# Patient Record
Sex: FEMALE | Race: WHITE | Employment: FULL TIME | URBAN - METROPOLITAN AREA
[De-identification: names, ages, dates, MRNs, and addresses within clinical notes are randomized per-mention and may not be internally consistent; named-entity substitution may affect disease eponyms.]

---

## 2017-01-12 ENCOUNTER — OFFICE VISIT (OUTPATIENT)
Dept: FAMILY MEDICINE CLINIC | Age: 57
End: 2017-01-12

## 2017-01-12 ENCOUNTER — TELEPHONE (OUTPATIENT)
Dept: FAMILY MEDICINE CLINIC | Age: 57
End: 2017-01-12

## 2017-01-12 VITALS
RESPIRATION RATE: 16 BRPM | HEART RATE: 90 BPM | BODY MASS INDEX: 25.61 KG/M2 | DIASTOLIC BLOOD PRESSURE: 74 MMHG | TEMPERATURE: 97.2 F | OXYGEN SATURATION: 97 % | WEIGHT: 150 LBS | HEIGHT: 64 IN | SYSTOLIC BLOOD PRESSURE: 128 MMHG

## 2017-01-12 DIAGNOSIS — E78.00 ELEVATED CHOLESTEROL: ICD-10-CM

## 2017-01-12 DIAGNOSIS — I10 ESSENTIAL HYPERTENSION: ICD-10-CM

## 2017-01-12 DIAGNOSIS — M47.812 DJD (DEGENERATIVE JOINT DISEASE), CERVICAL: ICD-10-CM

## 2017-01-12 DIAGNOSIS — J06.9 VIRAL URI WITH COUGH: Primary | ICD-10-CM

## 2017-01-12 DIAGNOSIS — N95.1 MENOPAUSAL SYNDROME (HOT FLASHES): ICD-10-CM

## 2017-01-12 RX ORDER — AZITHROMYCIN 250 MG/1
TABLET, FILM COATED ORAL
Qty: 6 TAB | Refills: 0 | Status: SHIPPED | OUTPATIENT
Start: 2017-01-12 | End: 2017-05-10 | Stop reason: ALTCHOICE

## 2017-01-12 RX ORDER — PROMETHAZINE HYDROCHLORIDE AND CODEINE PHOSPHATE 6.25; 1 MG/5ML; MG/5ML
5-10 SOLUTION ORAL
Qty: 240 ML | Refills: 0 | Status: SHIPPED | OUTPATIENT
Start: 2017-01-12 | End: 2017-05-10 | Stop reason: ALTCHOICE

## 2017-01-12 NOTE — MR AVS SNAPSHOT
Visit Information Date & Time Provider Department Dept. Phone Encounter #  
 1/12/2017 11:45 AM Loni Sanford MD [address] [place] [address] Road [phone] [id number] Your Appointments 9/29/2017  9:00 AM  
COMPLETE PHYSICAL with Loni Sanford MD  
[facility]) Appt Note: complete physical  
 [address] [id number]  
[phone]  
  
   
 [address] [address] Upcoming Health Maintenance Date Due Pneumococcal 19-64 Highest Risk (1 of 3 - PCV13) 7/19/1978 DTaP/Tdap/Td series (1 - Tdap) 7/19/1980 BREAST CANCER SCRN MAMMOGRAM 5/19/2016 PAP AKA CERVICAL CYTOLOGY 6/17/2018 COLONOSCOPY 8/31/2025 Allergies as of 1/12/2017  Review Complete On: 1/12/2017 By: Dianne Allen LPN Severity Noted Reaction Type Reactions Aspirin  03/29/2010    Nausea and Vomiting Current Immunizations  Never Reviewed No immunizations on file. Not reviewed this visit Vitals BP Pulse Temp Resp Height(growth percentile) Weight(growth percentile) 128/74 (BP 1 Location: Right arm, BP Patient Position: Sitting) 90 97.2 °F (36.2 °C) (Oral) 16 5' 4\" (1.626 m) 150 lb (68 kg) LMP SpO2 BMI OB Status Smoking Status 11/15/2010 97% 25.75 kg/m2 Postmenopausal Never Smoker Vitals History BMI and BSA Data Body Mass Index Body Surface Area 25.75 kg/m 2 1.75 m 2 Preferred Pharmacy Pharmacy Name Phone [facility] # [address], [address] [phone] Your Updated Medication List  
  
   
This list is accurate as of: 1/12/17  1:03 PM.  Always use your most recent med list.  
  
  
  
  
 azithromycin 250 mg tablet Commonly known as:  Idelia Fines Take 2 today and then 1 a day for the next 4 days  
  
 estradiol-norethindrone 1-0.5 mg per tablet Commonly known as:  [place] Girma Take 1 tablet by mouth daily. lisinopril 20 mg tablet Commonly known as:  Marilynne Shows Take 1 Tab by mouth daily. promethazine-codeine 6.25-10 mg/5 mL syrup Commonly known as:  PHENERGAN with CODEINE Take 5-10 mL by mouth four (4) times daily as needed for Cough. Max Daily Amount: 40 mL. Prescriptions Printed Refills  
 promethazine-codeine (PHENERGAN WITH CODEINE) 6.25-10 mg/5 mL syrup 0 Sig: Take 5-10 mL by mouth four (4) times daily as needed for Cough. Max Daily Amount: 40 mL. Class: Print Route: Oral  
  
Prescriptions Sent to Pharmacy Refills  
 azithromycin (ZITHROMAX) 250 mg tablet 0 Sig: Take 2 today and then 1 a day for the next 4 days Class: Normal  
 [facility] # [address] Ph #: [phone] Patient Instructions Allergies: Care Instructions Your Care Instructions Allergies occur when your body's defense system (immune system) overreacts to certain substances. The immune system treats a harmless substance as if it were a harmful germ or virus. Many things can cause this overreaction, including pollens, medicine, food, dust, animal dander, and mold. Allergies can be mild or severe. Mild allergies can be managed with home treatment. But medicine may be needed to prevent problems. Managing your allergies is an important part of staying healthy. Your doctor may suggest that you have allergy testing to help find out what is causing your allergies. When you know what things trigger your symptoms, you can avoid them. This can prevent allergy symptoms and other health problems. For severe allergies that cause reactions that affect your whole body (anaphylactic reactions), your doctor may prescribe a shot of epinephrine to carry with you in case you have a severe reaction. Learn how to give yourself the shot and keep it with you at all times. Make sure it is not . Follow-up care is a key part of your treatment and safety. Be sure to make and go to all appointments, and call your doctor if you are having problems. It's also a good idea to know your test results and keep a list of the medicines you take. How can you care for yourself at home? · If you have been told by your doctor that dust or dust mites are causing your allergy, decrease the dust around your bed: 
[facility] sheets, pillowcases, and other bedding in hot water every week. ¨ Use dust-proof covers for pillows, duvets, and mattresses. Avoid plastic covers because they tear easily and do not \"breathe. \" Wash as instructed on the label. ¨ Do not use any blankets and pillows that you do not need. ¨ Use blankets that you can wash in your washing machine. ¨ Consider removing drapes and carpets, which attract and hold dust, from your bedroom. · If you are allergic to house dust and mites, do not use home humidifiers. Your doctor can suggest ways you can control dust and mites. · Look for signs of cockroaches. Cockroaches cause allergic reactions. Use cockroach baits to get rid of them. Then, clean your home well. Cockroaches like areas where grocery bags, newspapers, empty bottles, or cardboard boxes are stored. Do not keep these inside your home, and keep trash and food containers sealed. Seal off any spots where cockroaches might enter your home. · If you are allergic to mold, get rid of furniture, rugs, and drapes that smell musty. Check for mold in the bathroom. · If you are allergic to outdoor pollen or mold spores, use air-conditioning. Change or clean all filters every month. Keep windows closed. · If you are allergic to pollen, stay inside when pollen counts are high. Use a vacuum  with a HEPA filter or a double-thickness filter at least two times each week. · Stay inside when air pollution is bad. Avoid paint fumes, perfumes, and other strong odors. · Avoid conditions that make your allergies worse. Stay away from smoke. Do not smoke or let anyone else smoke in your house. Do not use fireplaces or wood-burning stoves. · If you are allergic to your pets, change the air filter in your furnace every month. Use high-efficiency filters. · If you are allergic to pet dander, keep pets outside or out of your bedroom. Old carpet and cloth furniture can hold a lot of animal dander. You may need to replace them. When should you call for help? Give an epinephrine shot if: 
· You think you are having a severe allergic reaction. · You have symptoms in more than one body area, such as mild nausea and an itchy mouth. After giving an epinephrine shot call 911, even if you feel better. Call 911 if: 
· You have symptoms of a severe allergic reaction. These may include: 
¨ Sudden raised, red areas (hives) all over your body. ¨ Swelling of the throat, mouth, lips, or tongue. ¨ Trouble breathing. ¨ Passing out (losing consciousness). Or you may feel very lightheaded or suddenly feel weak, confused, or restless. · You have been given an epinephrine shot, even if you feel better. Call your doctor now or seek immediate medical care if: 
· You have symptoms of an allergic reaction, such as: ¨ A rash or hives (raised, red areas on the skin). ¨ Itching. ¨ Swelling. ¨ Belly pain, nausea, or vomiting. Watch closely for changes in your health, and be sure to contact your doctor if: 
· You do not get better as expected. Where can you learn more? Go to http://sherman-laya.info/. Enter Z605 in the search box to learn more about \"Allergies: Care Instructions. \" Current as of: February 12, 2016 Content Version: 11.1 © 3200-2816 [employer]. Care instructions adapted under license by [employer] (which disclaims liability or warranty for this information).  If you have questions about a medical condition or this instruction, always ask your healthcare professional. [place] [address] any warranty or liability for your use of this information. Please provide this summary of care documentation to your next provider. Your primary care clinician is listed as [place] [address], [facility]/s . If you have any questions after today's visit, please call [phone].

## 2017-01-12 NOTE — PATIENT INSTRUCTIONS

## 2017-01-12 NOTE — TELEPHONE ENCOUNTER
Javad Maya      -     [phone]    -  Patient demanding to see Maya Jackson due to a cold she has-  Advised nothing avail-  Can we fit her in  some where-  Please advise

## 2017-01-12 NOTE — PROGRESS NOTES
HISTORY OF PRESENT ILLNESS  BERENICE Ramirez is a 61 y.o. Female with a history of HTN and elevated cholesterol who presents to the office today for cold symptoms. She notes she has had a cough, runny nose and sneezing for the past 4 days. She denies a history of pneumonia. She notes she was recently exposed to a child with similar symptoms. Past Medical History   Diagnosis Date    DJD (degenerative joint disease), cervical 9/6/2010    Elevated cholesterol 8/09    HTN (hypertension) 6/09     Past Surgical History   Procedure Laterality Date    Hx appendectomy  1985    Hx refractive surgery  2008     Current Outpatient Prescriptions on File Prior to Visit   Medication Sig Dispense Refill    lisinopril (PRINIVIL, ZESTRIL) 20 mg tablet Take 1 Tab by mouth daily. 90 Tab 1    estradiol-norethindrone (ACTIVELLA) 1-0.5 mg per tablet Take 1 tablet by mouth daily. No current facility-administered medications on file prior to visit. Allergies   Allergen Reactions    Aspirin Nausea and Vomiting     Family History   Problem Relation Age of Onset    Hypertension Mother     Stroke Mother      age 40    Hypertension Maternal Grandmother     Stroke Maternal Grandmother      Social History     Social History    Marital status:      Spouse name: N/A    Number of children: N/A    Years of education: N/A     Occupational History          Social History Main Topics    Smoking status: Never Smoker    Smokeless tobacco: Never Used    Alcohol use Yes    Drug use: None    Sexual activity: Yes     Partners: Male     Other Topics Concern    Special Diet Yes     healthy    Exercise Yes     aerobic     Social History Narrative           Review of Systems   Constitutional: Negative for chills, diaphoresis, fever, malaise/fatigue and weight loss. HENT:        Runny nose and sneezing   Eyes: Negative for blurred vision, double vision, pain and redness. Respiratory: Positive for cough. Negative for shortness of breath and wheezing. Cardiovascular: Negative for chest pain, palpitations, orthopnea, claudication, leg swelling and PND. Skin: Negative for itching and rash. Neurological: Negative for dizziness, tingling, tremors, sensory change, speech change, focal weakness, seizures, loss of consciousness, weakness and headaches. Results for orders placed or performed in visit on 44/86/23   METABOLIC PANEL, COMPREHENSIVE   Result Value Ref Range    Glucose 87 65 - 99 mg/dL    BUN 14 6 - 24 mg/dL    Creatinine 0.77 0.57 - 1.00 mg/dL    GFR est non-AA 86 >59 mL/min/1.73    GFR est AA 99 >59 mL/min/1.73    BUN/Creatinine ratio 18 9 - 23    Sodium 140 136 - 144 mmol/L    Potassium 4.8 3.5 - 5.2 mmol/L    Chloride 102 97 - 106 mmol/L    CO2 22 18 - 29 mmol/L    Calcium 9.3 8.7 - 10.2 mg/dL    Protein, total 6.7 6.0 - 8.5 g/dL    Albumin 4.3 3.5 - 5.5 g/dL    GLOBULIN, TOTAL 2.4 1.5 - 4.5 g/dL    A-G Ratio 1.8 1.1 - 2.5    Bilirubin, total 0.3 0.0 - 1.2 mg/dL    Alk.  phosphatase 57 39 - 117 IU/L    AST 14 0 - 40 IU/L    ALT 11 0 - 32 IU/L   LIPID PANEL   Result Value Ref Range    Cholesterol, total 247 (H) 100 - 199 mg/dL    Triglyceride 200 (H) 0 - 149 mg/dL    HDL Cholesterol 66 >39 mg/dL    VLDL, calculated 40 5 - 40 mg/dL    LDL, calculated 141 (H) 0 - 99 mg/dL   CBC W/O DIFF   Result Value Ref Range    WBC 12.1 (H) 3.4 - 10.8 x10E3/uL    RBC 4.25 3.77 - 5.28 x10E6/uL    HGB 12.8 11.1 - 15.9 g/dL    HCT 38.4 34.0 - 46.6 %    MCV 90 79 - 97 fL    MCH 30.1 26.6 - 33.0 pg    MCHC 33.3 31.5 - 35.7 g/dL    RDW 13.6 12.3 - 15.4 %    PLATELET 974 102 - 937 x10E3/uL   URINALYSIS W/MICROSCOPIC   Result Value Ref Range    Specific Gravity 1.011 1.005 - 1.030    pH (UA) 5.0 5.0 - 7.5    Color Yellow Yellow    Appearance Clear Clear    Leukocyte Esterase Trace (A) Negative    Protein Negative Negative/Trace    Glucose Negative Negative    Ketone Negative Negative    Blood Negative Negative    Bilirubin Negative Negative    Urobilinogen 0.2 0.2 - 1.0 mg/dL    Nitrites Negative Negative    Microscopic Examination See additional order    MICROSCOPIC EXAMINATION   Result Value Ref Range    WBC 0-5 0 - 5 /hpf    RBC 0-2 0 - 2 /hpf    Epithelial cells 0-10 0 - 10 /hpf    Casts None seen None seen /lpf    Mucus Present Not Estab. Bacteria Moderate (A) None seen/Few   CVD REPORT   Result Value Ref Range    INTERPRETATION Note            Physical Exam  Visit Vitals    /74 (BP 1 Location: Right arm, BP Patient Position: Sitting)    Pulse 90    Temp 97.2 °F (36.2 °C) (Oral)    Resp 16    Ht 5' 4\" (1.626 m)    Wt 150 lb (68 kg)    LMP 11/15/2010    SpO2 97%    BMI 25.75 kg/m2     Throat: Lips, mucosa, and tongue normal. Teeth and gums normal  Neck: supple, symmetrical, trachea midline, no adenopathy, thyroid: not enlarged, symmetric, no tenderness/mass/nodules, no carotid bruit and no JVD  Lungs: very wet sounding cough with rhonchi heard anteriorly on both sides, left side is clear posteriorly and the right side has rhonchi and few squeaks with normal respiration and coughing, they do improve after several coughs but do not resolve          ASSESSMENT and PLAN    ICD-10-CM ICD-9-CM    1. Viral URI with cough J06.9 465.9     B97.89     2. Essential hypertension I10 401.9    3. Elevated cholesterol E78.00 272.0    4. Menopausal syndrome (hot flashes) N95.1 627.2    5. DJD (degenerative joint disease), cervical M50.30 722.4      Mariella Erazo was seen today for cold symptoms. Diagnoses and all orders for this visit:    Viral URI with cough    Essential hypertension    Elevated cholesterol    Menopausal syndrome (hot flashes)    DJD (degenerative joint disease), cervical    Other orders  -     azithromycin (ZITHROMAX) 250 mg tablet; Take 2 today and then 1 a day for the next 4 days  -     promethazine-codeine (PHENERGAN WITH CODEINE) 6.25-10 mg/5 mL syrup;  Take 5-10 mL by mouth four (4) times daily as needed for Cough. Max Daily Amount: 40 mL. Follow-up Disposition:  Return if symptoms worsen or fail to improve. reviewed medications and side effects in detail  Please call my office if there are any questions- [phone]. Discussed expected course/resolution/complications of diagnosis in detail with patient. Medication risks/benefits/costs/interactions/alternatives discussed with patient. Pt was given an after visit summary which includes diagnoses, current medications & vitals. Pt expressed understanding with the diagnosis and plan. Total 25 minutes,60 % counseling re: Patient to call if no better in 3 -4 days and prn new problems. I recommended Mucinex DM during the day and Phenergan with Codeine at night. .Because of the deep cough and associated sore throat, I want her to start Z-[place]  The benefit/risk, onset of action, method of taking, and side effects of this medication discussed in detail with the patient. Reviewed in detail the proper technique of checking the blood pressure- check it on an average day only, not on a stressful day, sitting, no exercise for at least 1 hour and not experiencing any new pain( chronic pain is OK). Patient encouraged to check BP sitting and standing at least once a month and to report these readings to me if > 140/ 90 on average , or if the standing BP is >  15 points lower than the sitting. \Reviewed symptoms, or lack thereof, of hypertension and elevated cholesterol. Also, discussed symptoms of concern that were noted today in the note above, treatment options( including doing nothing), when to follow up before recommended time frame. Also, answered all questions. This document was written by Giovana Gandhi, as dictated by Amber Ott MD.   I have reviewed and agree with the above note and have made corrections where appropriate Walter Serrano M.D.

## 2017-01-12 NOTE — PROGRESS NOTES
Chief Complaint   Patient presents with    Cold Symptoms     cough with litle mucus -clear, runny nose, sneezing started Monday       Reviewed Record in preparation for visit and have obtained necessary documentation. Identified pt with two pt identifiers (Name @ )    Health Maintenance Due   Topic    Pneumococcal 19-64 Highest Risk (1 of 3 - PCV13)    DTaP/Tdap/Td series (1 - Tdap)    BREAST CANCER SCRN MAMMOGRAM          1. Have you been to the ER, urgent care clinic since your last visit? Hospitalized since your last visit? No    2. Have you seen or consulted any other health care providers outside of the [address] [place] [address] since your last visit? Include any pap smears or colon screening.  No

## 2017-05-10 ENCOUNTER — OFFICE VISIT (OUTPATIENT)
Dept: FAMILY MEDICINE CLINIC | Age: 57
End: 2017-05-10

## 2017-05-10 VITALS
BODY MASS INDEX: 25.54 KG/M2 | OXYGEN SATURATION: 99 % | TEMPERATURE: 98 F | DIASTOLIC BLOOD PRESSURE: 86 MMHG | HEIGHT: 64 IN | SYSTOLIC BLOOD PRESSURE: 114 MMHG | HEART RATE: 78 BPM | RESPIRATION RATE: 18 BRPM | WEIGHT: 149.6 LBS

## 2017-05-10 DIAGNOSIS — R21 RASH OF HANDS: Primary | ICD-10-CM

## 2017-05-10 DIAGNOSIS — I10 ESSENTIAL HYPERTENSION: ICD-10-CM

## 2017-05-10 DIAGNOSIS — E78.5 HYPERLIPIDEMIA LDL GOAL <130: ICD-10-CM

## 2017-05-10 RX ORDER — PREDNISONE 20 MG/1
20 TABLET ORAL 3 TIMES DAILY
Qty: 21 TAB | Refills: 0 | Status: SHIPPED | OUTPATIENT
Start: 2017-05-10 | End: 2018-01-05 | Stop reason: ALTCHOICE

## 2017-05-10 NOTE — PROGRESS NOTES
Chief Complaint   Patient presents with    Rash     Redness to palm of hands and bottom of both feet. Patient states that it is hard to walk on. Palms of hands and between fingers are itchy. Patient had difficulty swallowing Monday and Tuesday night. 1. Have you been to the ER, urgent care clinic since your last visit? Hospitalized since your last visit? No    2. Have you seen or consulted any other health care providers outside of the [facility] since your last visit? Include any pap smears or colon screening. No     Patient states areas on hands and feet happened over night.

## 2017-05-10 NOTE — PROGRESS NOTES
HISTORY OF PRESENT ILLNESS  HPI  Jose Barrera is a 61 y.o. Female with history of HTN and hyperlipidemia who presents to office today for rash. Pt complains of an itchy, red rash on both palms and the bottom of both feet since the middle of last night. She states that she visited her grandson on 5/5/17 and that he was recovering from an illness with symptoms of a similar rash, along with a fever. Pt notes some difficulty swallowing on 5/8/17. Pt denies fever. Past Medical History:   Diagnosis Date    DJD (degenerative joint disease), cervical 9/6/2010    Elevated cholesterol 8/09    HTN (hypertension) 6/09     Past Surgical History:   Procedure Laterality Date    HX APPENDECTOMY  1985    HX REFRACTIVE SURGERY  2008     Current Outpatient Prescriptions on File Prior to Visit   Medication Sig Dispense Refill    lisinopril (PRINIVIL, ZESTRIL) 20 mg tablet Take 1 Tab by mouth daily. 90 Tab 1    estradiol-norethindrone (ACTIVELLA) 1-0.5 mg per tablet Take 1 tablet by mouth daily. No current facility-administered medications on file prior to visit. Allergies   Allergen Reactions    Aspirin Nausea and Vomiting     Family History   Problem Relation Age of Onset    Hypertension Mother     Stroke Mother      age 40    Hypertension Maternal Grandmother     Stroke Maternal Grandmother      Social History     Social History    Marital status:      Spouse name: N/A    Number of children: N/A    Years of education: N/A     Occupational History          Social History Main Topics    Smoking status: Never Smoker    Smokeless tobacco: Never Used    Alcohol use Yes    Drug use: None    Sexual activity: Yes     Partners: Male     Other Topics Concern    Special Diet Yes     healthy    Exercise Yes     aerobic     Social History Narrative               Review of Systems   Constitutional: Negative for chills, diaphoresis, fever, malaise/fatigue and weight loss.    HENT: Positive for congestion. Eyes: Negative for blurred vision, double vision, pain and redness. Respiratory: Negative for cough, shortness of breath and wheezing. Cardiovascular: Negative for chest pain, palpitations, orthopnea, claudication, leg swelling and PND. Skin: Positive for itching and rash (bilateral palms & bottom of right foot). Neurological: Negative for dizziness, tingling, tremors, sensory change, speech change, focal weakness, seizures, loss of consciousness, weakness and headaches. Results for orders placed or performed in visit on 22/24/78   METABOLIC PANEL, COMPREHENSIVE   Result Value Ref Range    Glucose 87 65 - 99 mg/dL    BUN 14 6 - 24 mg/dL    Creatinine 0.77 0.57 - 1.00 mg/dL    GFR est non-AA 86 >59 mL/min/1.73    GFR est AA 99 >59 mL/min/1.73    BUN/Creatinine ratio 18 9 - 23    Sodium 140 136 - 144 mmol/L    Potassium 4.8 3.5 - 5.2 mmol/L    Chloride 102 97 - 106 mmol/L    CO2 22 18 - 29 mmol/L    Calcium 9.3 8.7 - 10.2 mg/dL    Protein, total 6.7 6.0 - 8.5 g/dL    Albumin 4.3 3.5 - 5.5 g/dL    GLOBULIN, TOTAL 2.4 1.5 - 4.5 g/dL    A-G Ratio 1.8 1.1 - 2.5    Bilirubin, total 0.3 0.0 - 1.2 mg/dL    Alk.  phosphatase 57 39 - 117 IU/L    AST (SGOT) 14 0 - 40 IU/L    ALT (SGPT) 11 0 - 32 IU/L   LIPID PANEL   Result Value Ref Range    Cholesterol, total 247 (H) 100 - 199 mg/dL    Triglyceride 200 (H) 0 - 149 mg/dL    HDL Cholesterol 66 >39 mg/dL    VLDL, calculated 40 5 - 40 mg/dL    LDL, calculated 141 (H) 0 - 99 mg/dL   CBC W/O DIFF   Result Value Ref Range    WBC 12.1 (H) 3.4 - 10.8 x10E3/uL    RBC 4.25 3.77 - 5.28 x10E6/uL    HGB 12.8 11.1 - 15.9 g/dL    HCT 38.4 34.0 - 46.6 %    MCV 90 79 - 97 fL    MCH 30.1 26.6 - 33.0 pg    MCHC 33.3 31.5 - 35.7 g/dL    RDW 13.6 12.3 - 15.4 %    PLATELET 045 288 - 290 x10E3/uL   URINALYSIS W/MICROSCOPIC   Result Value Ref Range    Specific Gravity 1.011 1.005 - 1.030    pH (UA) 5.0 5.0 - 7.5    Color Yellow Yellow    Appearance Clear Clear Leukocyte Esterase Trace (A) Negative    Protein Negative Negative/Trace    Glucose Negative Negative    Ketone Negative Negative    Blood Negative Negative    Bilirubin Negative Negative    Urobilinogen 0.2 0.2 - 1.0 mg/dL    Nitrites Negative Negative    Microscopic Examination See additional order    MICROSCOPIC EXAMINATION   Result Value Ref Range    WBC 0-5 0 - 5 /hpf    RBC 0-2 0 - 2 /hpf    Epithelial cells 0-10 0 - 10 /hpf    Casts None seen None seen /lpf    Mucus Present Not Estab. Bacteria Moderate (A) None seen/Few   CVD REPORT   Result Value Ref Range    INTERPRETATION Note                Physical Exam  Visit Vitals    /86 (BP 1 Location: Right arm, BP Patient Position: Sitting)    Pulse 78    Temp 98 °F (36.7 °C) (Oral)    Resp 18    Ht 5' 4\" (1.626 m)    Wt 149 lb 9.6 oz (67.9 kg)    LMP 11/15/2010    SpO2 99%    BMI 25.68 kg/m2     Eyes: conjunctivae/corneas clear. PERRL, EOM's intact. Fundi benign  Nose: Nares normal. Septum midline. mild congestion with little bit of watery drainage, tender erythematous area on her right lateral nose area, no pustule  Throat: Mild diffuse erythema, there appears to be a superficial ulcer on the right side of her soft palate approximately 3-4 mm in diameter  Neck: supple, symmetrical, trachea midline, no adenopathy, thyroid: not enlarged, symmetric, no tenderness/mass/nodules, no carotid bruit and no JVD  Lungs: clear to auscultation bilaterally  Heart: regular rate and rhythm, S1, S2 normal, no murmur, click, rub or gallop  Hands: multiple 3 mm erythematous areas, some of them have central clearing and they seem to annie partially, no signs of any ulcerations or pustules; she has a similar but less prominent area on the bottom of her right foot. all these areas itch and are a little bit tender to palpation                    ASSESSMENT and PLAN    ICD-10-CM ICD-9-CM    1. Rash of hands- viral R21 782. 1 predniSONE (DELTASONE) 20 mg tablet   2. Essential hypertension I10 401.9    3. Hyperlipidemia LDL goal <130 E78.5 272.4      Melissa was seen today for rash. Diagnoses and all orders for this visit:    Rash of hands- viral  -     predniSONE (DELTASONE) 20 mg tablet; Take 1 Tab by mouth three (3) times daily. Essential hypertension    Hyperlipidemia LDL goal <130      Follow-up Disposition:  Return if symptoms worsen or fail to improve. reviewed medications and side effects in detail  Please call my office if there are any questions- [phone]. Discussed expected course/resolution/complications of diagnosis in detail with patient. Medication risks/benefits/costs/interactions/alternatives discussed with patient. Pt was given an after visit summary which includes diagnoses, current medications & vitals. Pt expressed understanding with the diagnosis and plan. Patient to call if no better in 3 -4 days and prn new problems. Most likely this is the hand foot and mouth disease that her grandson had. It usually goes away in a few days. She should do symptomatic treatment with Benadryl up to 50mg every 6 hours, and if that makes her drowsy she can use that at night and use Allegra during the day. If she's no better overnight with the Benadryl and Allegra, she can give a trial of prednisone as well. If she's doing well with the Benadryl, I would just stick with that. This document was written by Michell Hartman, as dictated by Loc Mijares MD.  I have reviewed and agree with the above note and have made corrections where appropriate Walter Owens M.D.

## 2017-05-10 NOTE — MR AVS SNAPSHOT
Visit Information Date & Time Provider Department Dept. Phone Encounter #  
 5/10/2017  4:15 PM Evan Jeff MD [facility] [phone] [id number] Your Appointments 9/29/2017  9:00 AM  
COMPLETE PHYSICAL with Evan Jeff MD  
[facility]) Appt Note: complete physical  
 [address] [phone]  
[phone]  
  
   
 [address] [phone] Upcoming Health Maintenance Date Due DTaP/Tdap/Td series (1 - Tdap) 7/19/1980 BREAST CANCER SCRN MAMMOGRAM 5/19/2016 INFLUENZA AGE 9 TO ADULT 8/1/2017 Pneumococcal 19-64 Highest Risk (2 of 3 - PCV13) 1/15/2018 PAP AKA CERVICAL CYTOLOGY 6/17/2018 COLONOSCOPY 8/31/2025 Allergies as of 5/10/2017  Review Complete On: 5/10/2017 By: Paris Winkler LPN Severity Noted Reaction Type Reactions Aspirin  03/29/2010    Nausea and Vomiting Current Immunizations  Never Reviewed No immunizations on file. Not reviewed this visit Vitals BP Pulse Temp Resp Height(growth percentile) Weight(growth percentile) 114/86 (BP 1 Location: Right arm, BP Patient Position: Sitting) 78 98 °F (36.7 °C) (Oral) 18 5' 4\" (1.626 m) 149 lb 9.6 oz (67.9 kg) LMP SpO2 BMI OB Status Smoking Status 11/15/2010 99% 25.68 kg/m2 Postmenopausal Never Smoker BMI and BSA Data Body Mass Index Body Surface Area  
 25.68 kg/m 2 1.75 m 2 Preferred Pharmacy Pharmacy Name Phone [facility] [phone] [facility] [phone] Your Updated Medication List  
  
   
This list is accurate as of: 5/10/17  4:50 PM.  Always use your most recent med list.  
  
  
  
  
 estradiol-norethindrone 1-0.5 mg per tablet Commonly known as:  Ludlow Members Take 1 tablet by mouth daily. lisinopril 20 mg tablet Commonly known as:  Adina Moulder Take 1 Tab by mouth daily. predniSONE 20 mg tablet Commonly known as:  Edelmira Guadalupe Take 1 Tab by mouth three (3) times daily. Prescriptions Printed Refills  
 predniSONE (DELTASONE) 20 mg tablet 0 Sig: Take 1 Tab by mouth three (3) times daily. Class: Print Route: Oral  
  
Patient Instructions Rash: Care Instructions Your Care Instructions A rash is any irritation or inflammation of the skin. Rashes have many possible causes, including allergy, infection, illness, heat, and emotional stress. Follow-up care is a key part of your treatment and safety. Be sure to make and go to all appointments, and call your doctor if you are having problems. Its also a good idea to know your test results and keep a list of the medicines you take. How can you care for yourself at home? · Wash the area with water only. Soap can make dryness and itching worse. Pat dry. · Put cold, wet cloths on the rash to reduce itching. · Keep cool, and stay out of the sun. · Leave the rash open to the air as much of the time as possible. · Sometimes petroleum jelly (Vaseline) can help relieve the discomfort caused by a rash. A moisturizing lotion, such as Cetaphil, also may help. Calamine lotion may help for rashes caused by contact with something (such as a plant or soap) that irritated the skin. Use it 3 or 4 times a day. · If your doctor prescribed a cream, use it as directed. If your doctor prescribed medicine, take it exactly as directed. · If your rash itches so badly that it interferes with your normal activities, take an over-the-counter antihistamine, such as diphenhydramine (Benadryl) or loratadine (Claritin). Read and follow all instructions on the label. When should you call for help? Call your doctor now or seek immediate medical care if: 
· You have signs of infection, such as: 
¨ Increased pain, swelling, warmth, or redness. ¨ Red streaks leading from the area. ¨ Pus draining from the area. ¨ A fever. · You have joint pain along with the rash. Watch closely for changes in your health, and be sure to contact your doctor if: 
· Your rash is changing or getting worse. For example, call if you have pain along with the rash, the rash is spreading, or you have new blisters. · You do not get better after 1 week. Where can you learn more? Go to http://sherman-laya.info/. Enter H893 in the search box to learn more about \"Rash: Care Instructions. \" Current as of: October 13, 2016 Content Version: 11.2 © 8544-9896 [employer]. Care instructions adapted under license by [employer] (which disclaims liability or warranty for this information). If you have questions about a medical condition or this instruction, always ask your healthcare professional. [address] any warranty or liability for your use of this information. Please provide this summary of care documentation to your next provider. Your primary care clinician is listed as [address] [address], [facility]/s . If you have any questions after today's visit, please call [phone].

## 2017-05-10 NOTE — PATIENT INSTRUCTIONS

## 2017-07-09 DIAGNOSIS — I10 ESSENTIAL HYPERTENSION: ICD-10-CM

## 2017-07-10 RX ORDER — LISINOPRIL 20 MG/1
TABLET ORAL
Qty: 30 TAB | Refills: 1 | Status: SHIPPED | OUTPATIENT
Start: 2017-07-10 | End: 2017-09-25 | Stop reason: SDUPTHER

## 2017-09-25 DIAGNOSIS — I10 ESSENTIAL HYPERTENSION: ICD-10-CM

## 2017-09-25 RX ORDER — LISINOPRIL 20 MG/1
TABLET ORAL
Qty: 30 TAB | Refills: 0 | Status: SHIPPED | OUTPATIENT
Start: 2017-09-25 | End: 2017-09-26 | Stop reason: SDUPTHER

## 2017-12-07 DIAGNOSIS — I10 ESSENTIAL HYPERTENSION: ICD-10-CM

## 2017-12-08 RX ORDER — LISINOPRIL 20 MG/1
TABLET ORAL
Qty: 30 TAB | Refills: 0 | Status: SHIPPED | OUTPATIENT
Start: 2017-12-08 | End: 2018-01-05 | Stop reason: SDUPTHER

## 2018-01-05 ENCOUNTER — OFFICE VISIT (OUTPATIENT)
Dept: FAMILY MEDICINE CLINIC | Age: 58
End: 2018-01-05

## 2018-01-05 VITALS
HEART RATE: 75 BPM | SYSTOLIC BLOOD PRESSURE: 118 MMHG | BODY MASS INDEX: 27.59 KG/M2 | TEMPERATURE: 98.1 F | WEIGHT: 161.6 LBS | OXYGEN SATURATION: 98 % | HEIGHT: 64 IN | DIASTOLIC BLOOD PRESSURE: 76 MMHG | RESPIRATION RATE: 18 BRPM

## 2018-01-05 DIAGNOSIS — N95.1 MENOPAUSAL SYNDROME (HOT FLASHES): ICD-10-CM

## 2018-01-05 DIAGNOSIS — E78.00 ELEVATED CHOLESTEROL: ICD-10-CM

## 2018-01-05 DIAGNOSIS — R63.5 ABNORMAL WEIGHT GAIN: ICD-10-CM

## 2018-01-05 DIAGNOSIS — I10 ESSENTIAL HYPERTENSION: ICD-10-CM

## 2018-01-05 DIAGNOSIS — Z23 ENCOUNTER FOR IMMUNIZATION: ICD-10-CM

## 2018-01-05 DIAGNOSIS — Z00.00 ROUTINE GENERAL MEDICAL EXAMINATION AT A HEALTH CARE FACILITY: Primary | ICD-10-CM

## 2018-01-05 DIAGNOSIS — M47.812 DJD (DEGENERATIVE JOINT DISEASE), CERVICAL: ICD-10-CM

## 2018-01-05 RX ORDER — LISINOPRIL 20 MG/1
TABLET ORAL
Qty: 90 TAB | Refills: 1 | Status: SHIPPED | OUTPATIENT
Start: 2018-01-05 | End: 2018-07-23 | Stop reason: SDUPTHER

## 2018-01-05 NOTE — LETTER
6/26/2018 10:16 AM 
 
Ms. Abad Gold [address] [facility] [place] [id number] Dear Abad Gold: 
 
Please find your most recent results below. Resulted Orders LIPID PANEL Result Value Ref Range Cholesterol, total 254 (H) 100 - 199 mg/dL Triglyceride 154 (H) 0 - 149 mg/dL HDL Cholesterol 82 >39 mg/dL VLDL, calculated 31 5 - 40 mg/dL LDL, calculated 141 (H) 0 - 99 mg/dL Narrative Performed at:  [address]  [id number] : Carine Faria MD, Phone:  [phone] METABOLIC PANEL, COMPREHENSIVE Result Value Ref Range Glucose 86 65 - 99 mg/dL BUN 16 6 - 24 mg/dL Creatinine 0.66 0.57 - 1.00 mg/dL GFR est non-AA 98 >59 mL/min/1.73 GFR est  >59 mL/min/1.73  
 BUN/Creatinine ratio 24 (H) 9 - 23 Sodium 141 134 - 144 mmol/L Potassium 4.4 3.5 - 5.2 mmol/L Chloride 104 96 - 106 mmol/L  
 CO2 25 18 - 29 mmol/L Calcium 9.0 8.7 - 10.2 mg/dL Protein, total 7.0 6.0 - 8.5 g/dL Albumin 4.2 3.5 - 5.5 g/dL GLOBULIN, TOTAL 2.8 1.5 - 4.5 g/dL A-G Ratio 1.5 1.2 - 2.2 Bilirubin, total 0.2 0.0 - 1.2 mg/dL Alk. phosphatase 57 39 - 117 IU/L  
 AST (SGOT) 17 0 - 40 IU/L  
 ALT (SGPT) 19 0 - 32 IU/L Narrative Performed at:  [address]  [id number] : Carine Faria MD, Phone:  [phone] CBC W/O DIFF Result Value Ref Range WBC 8.0 3.4 - 10.8 x10E3/uL  
 RBC 4.15 3.77 - 5.28 x10E6/uL HGB 12.5 11.1 - 15.9 g/dL HCT 37.5 34.0 - 46.6 % MCV 90 79 - 97 fL  
 MCH 30.1 26.6 - 33.0 pg  
 MCHC 33.3 31.5 - 35.7 g/dL  
 RDW 14.1 12.3 - 15.4 % PLATELET 006 035 - 809 x10E3/uL Narrative Performed at:  [address]  [id number] : Carine Faria MD, Phone:  [phone] URINALYSIS W/MICROSCOPIC Result Value Ref Range  Specific Gravity 1.016 1.005 - 1.030  
 pH (UA) 5.0 5.0 - 7.5 Color Yellow Yellow Appearance Clear Clear Leukocyte Esterase 2+ (A) Negative Protein Negative Negative/Trace Glucose Negative Negative Ketone Negative Negative Blood Negative Negative Bilirubin Negative Negative Urobilinogen 0.2 0.2 - 1.0 mg/dL Nitrites Negative Negative Microscopic Examination See additional order Comment:  
   Microscopic was indicated and was performed. Narrative Performed at:  [address]  [id number] : Maddy Tran MD, Phone:  [phone] T4, FREE Result Value Ref Range T4, Free 0.87 0.82 - 1.77 ng/dL Narrative Performed at:  [address]  [id number] : Maddy Tran MD, Phone:  [phone] TSH 3RD GENERATION Result Value Ref Range TSH 1.870 0.450 - 4.500 uIU/mL Narrative Performed at:  [address]  [id number] : Maddy Tran MD, Phone:  [phone] MICROSCOPIC EXAMINATION Result Value Ref Range WBC 0-5 0 - 5 /hpf  
 RBC 0-2 0 - 2 /hpf Epithelial cells 0-10 0 - 10 /hpf Casts None seen None seen /lpf Mucus Present Not Estab. Bacteria Few None seen/Few Narrative Performed at:  [address]  [id number] : Maddy Tran MD, Phone:  [phone] CVD REPORT Result Value Ref Range INTERPRETATION Note Comment:  
   Supplemental report is available. Narrative Performed at:  [address] [place] [address] [address] [place] [address]  [id number] : Casie Barney PhD, Phone:  [phone] RECOMMENDATIONS: 
I reviewed your labs a few days after they were done in January and thought I had sent you a letter about them.  I was reviewing my outstanding labs today and discovered that you were never notified of those results. I apologize.  
Mariia Dears of your recent lab tests are normal or at goal except the cholesterol numbers which are unchanged from 1 year before. . As mentioned then- the only thing recommended at this point is continued weight control and a low saturated fat diet. I would continue a yearly physical and we can recheck it then. The new cardiac risk calculator recommends cholesterol medication for you at the age of 79  If nothing else changes( earlier if you develop high blood pressure or diabetes). Please call me if you have any questions: [phone] Sincerely, 
 
 
Tang De MD

## 2018-01-05 NOTE — PROGRESS NOTES
HISTORY OF PRESENT ILLNESS  BERENICE Sampson is a 61 y.o. female with history of HTN, DJD, and hyperlipidemia who presents to office today for a complete physical. Pt states that she regularly walks outside for exercise for 30 minutes a day, and she denies any problems with this activity. She denies unusual SOB, chest pain, and any recent ER visits or hospitalizations. Pt sees GYN Dr. Qamar Alanis at [facility] for her regular pap smear and mammogram. She states her pap smear, mammogram, and breast exam have all been normal. Pt complains of hot flashes; she is currently taking Activella per Dr. Qamar Alanis, averaging one pill every 2-3 days. Pt does not think she's had a tetanus shot within the past 10 years. She states she does not get flu shots. Past Medical History:   Diagnosis Date    DJD (degenerative joint disease), cervical 9/6/2010    Elevated cholesterol 8/09    HTN (hypertension) 6/09     Past Surgical History:   Procedure Laterality Date    HX APPENDECTOMY  1985    HX REFRACTIVE SURGERY  2008     Current Outpatient Prescriptions on File Prior to Visit   Medication Sig Dispense Refill    estradiol-norethindrone (ACTIVELLA) 1-0.5 mg per tablet Take 1 tablet by mouth daily. No current facility-administered medications on file prior to visit.       Allergies   Allergen Reactions    Aspirin Nausea and Vomiting     Family History   Problem Relation Age of Onset    Hypertension Mother     Stroke Mother      age 40    Hypertension Maternal Grandmother     Stroke Maternal Grandmother      Social History     Social History    Marital status:      Spouse name: N/A    Number of children: N/A    Years of education: N/A     Occupational History          Social History Main Topics    Smoking status: Never Smoker    Smokeless tobacco: Never Used    Alcohol use Yes    Drug use: None    Sexual activity: Yes     Partners: Male     Other Topics Concern    Special Diet Yes healthy    Exercise Yes     aerobic     Social History Narrative             Review of Systems   Constitutional: Negative for chills, diaphoresis, fever, malaise/fatigue and weight loss. HENT: Negative for congestion, ear discharge, ear pain, hearing loss, nosebleeds, sore throat and tinnitus. Eyes: Negative for blurred vision, double vision, photophobia, pain, discharge and redness. Respiratory: Negative for cough, hemoptysis, sputum production, shortness of breath, wheezing and stridor. Cardiovascular: Negative for chest pain, palpitations, orthopnea, claudication, leg swelling and PND. Gastrointestinal: Negative for abdominal pain, blood in stool, constipation, diarrhea, heartburn, melena, nausea and vomiting. Genitourinary: Negative for dysuria, flank pain, frequency, hematuria and urgency. Musculoskeletal: Negative for back pain, falls, joint pain, myalgias and neck pain. Skin: Negative for itching and rash. Neurological: Negative for dizziness, tingling, tremors, sensory change, speech change, focal weakness, seizures, loss of consciousness, weakness and headaches. Endo/Heme/Allergies: Negative for environmental allergies and polydipsia. Does not bruise/bleed easily. Hot flashes   Psychiatric/Behavioral: Negative for depression, hallucinations, memory loss, substance abuse and suicidal ideas. The patient is not nervous/anxious and does not have insomnia.       Results for orders placed or performed in visit on 65/45/52   METABOLIC PANEL, COMPREHENSIVE   Result Value Ref Range    Glucose 87 65 - 99 mg/dL    BUN 14 6 - 24 mg/dL    Creatinine 0.77 0.57 - 1.00 mg/dL    GFR est non-AA 86 >59 mL/min/1.73    GFR est AA 99 >59 mL/min/1.73    BUN/Creatinine ratio 18 9 - 23    Sodium 140 136 - 144 mmol/L    Potassium 4.8 3.5 - 5.2 mmol/L    Chloride 102 97 - 106 mmol/L    CO2 22 18 - 29 mmol/L    Calcium 9.3 8.7 - 10.2 mg/dL    Protein, total 6.7 6.0 - 8.5 g/dL    Albumin 4.3 3.5 - 5.5 g/dL    GLOBULIN, TOTAL 2.4 1.5 - 4.5 g/dL    A-G Ratio 1.8 1.1 - 2.5    Bilirubin, total 0.3 0.0 - 1.2 mg/dL    Alk. phosphatase 57 39 - 117 IU/L    AST (SGOT) 14 0 - 40 IU/L    ALT (SGPT) 11 0 - 32 IU/L   LIPID PANEL   Result Value Ref Range    Cholesterol, total 247 (H) 100 - 199 mg/dL    Triglyceride 200 (H) 0 - 149 mg/dL    HDL Cholesterol 66 >39 mg/dL    VLDL, calculated 40 5 - 40 mg/dL    LDL, calculated 141 (H) 0 - 99 mg/dL   CBC W/O DIFF   Result Value Ref Range    WBC 12.1 (H) 3.4 - 10.8 x10E3/uL    RBC 4.25 3.77 - 5.28 x10E6/uL    HGB 12.8 11.1 - 15.9 g/dL    HCT 38.4 34.0 - 46.6 %    MCV 90 79 - 97 fL    MCH 30.1 26.6 - 33.0 pg    MCHC 33.3 31.5 - 35.7 g/dL    RDW 13.6 12.3 - 15.4 %    PLATELET 978 443 - 453 x10E3/uL   URINALYSIS W/MICROSCOPIC   Result Value Ref Range    Specific Gravity 1.011 1.005 - 1.030    pH (UA) 5.0 5.0 - 7.5    Color Yellow Yellow    Appearance Clear Clear    Leukocyte Esterase Trace (A) Negative    Protein Negative Negative/Trace    Glucose Negative Negative    Ketone Negative Negative    Blood Negative Negative    Bilirubin Negative Negative    Urobilinogen 0.2 0.2 - 1.0 mg/dL    Nitrites Negative Negative    Microscopic Examination See additional order    MICROSCOPIC EXAMINATION   Result Value Ref Range    WBC 0-5 0 - 5 /hpf    RBC 0-2 0 - 2 /hpf    Epithelial cells 0-10 0 - 10 /hpf    Casts None seen None seen /lpf    Mucus Present Not Estab. Bacteria Moderate (A) None seen/Few   CVD REPORT   Result Value Ref Range    INTERPRETATION Note              Physical Exam  Visit Vitals    /76 (BP 1 Location: Left arm, BP Patient Position: Sitting)    Pulse 75    Temp 98.1 °F (36.7 °C) (Oral)    Resp 18    Ht 5' 4\" (1.626 m)    Wt 161 lb 9.6 oz (73.3 kg)    LMP 11/15/2010    SpO2 98%    BMI 27.74 kg/m2     General:  Alert, cooperative, no distress, appears stated age. Head:  Normocephalic, without obvious abnormality, atraumatic. Eyes:  Conjunctivae/corneas clear. PERRL, EOMs intact. Fundi benign. Ears:  Normal TMs and external ear canals both ears. Nose: Nares normal. Septum midline. Mucosa normal. No drainage or sinus tenderness. Throat: Lips, mucosa, and tongue normal. Teeth and gums normal.   Neck: Supple, symmetrical, trachea midline, no adenopathy, thyroid: no enlargement/tenderness/nodules, no carotid bruit and no JVD. Back:   Symmetric, no curvature. ROM normal. No CVA tenderness. Lungs:   Clear to auscultation bilaterally. Chest wall:  No tenderness or deformity. Heart:  Regular rate and rhythm, S1, S2 normal, no murmur, click, rub or gallop. Abdomen:   Soft, non-tender. Bowel sounds normal. No masses,  No organomegaly. Extremities: Extremities normal, atraumatic, no cyanosis or edema. Pulses: 2+ and symmetric all extremities. Skin: Skin color, texture, turgor normal. No rashes or lesions. Lymph nodes: Cervical, supraclavicular, and axillary nodes normal.   Neurologic: CNII-XII intact. Normal strength, sensation and reflexes throughout. ASSESSMENT and PLAN    ICD-10-CM ICD-9-CM    1. Routine general medical examination at a health care facility Z00.00 V70.0 LIPID PANEL      METABOLIC PANEL, COMPREHENSIVE      CBC W/O DIFF      URINALYSIS W/MICROSCOPIC      T4, FREE      TSH 3RD GENERATION      TETANUS, DIPHTHERIA TOXOIDS AND ACELLULAR PERTUSSIS VACCINE (TDAP), IN INDIVIDS. >=7, IM      CANCELED: AMB POC EKG ROUTINE W/ 12 LEADS, INTER & REP   2. Essential hypertension I10 401.9 lisinopril (PRINIVIL, ZESTRIL) 20 mg tablet   3. DJD (degenerative joint disease), cervical M50.30 722.4    4. Menopausal syndrome (hot flashes) N95.1 627.2    5. Abnormal weight gain R63.5 783.1 T4, FREE      TSH 3RD GENERATION   6. Elevated cholesterol E78.00 272.0 LIPID PANEL   7.  Encounter for immunization Z23 V03.89 TETANUS, DIPHTHERIA TOXOIDS AND ACELLULAR PERTUSSIS VACCINE (TDAP), IN INDIVIDS. >=7, IM      [place] IMMUNIZ ADMIN,1 SINGLE/COMB VAC/TOXOID Diagnoses and all orders for this visit:    1. Routine general medical examination at a health care facility  -     LIPID PANEL  -     METABOLIC PANEL, COMPREHENSIVE  -     CBC W/O DIFF  -     URINALYSIS W/MICROSCOPIC  -     T4, FREE  -     TSH 3RD GENERATION  -     Tetanus, diphtheria toxoids and acellular pertussis (TDAP) vaccine, in individuals >=7 years, IM    2. Essential hypertension  -     lisinopril (PRINIVIL, ZESTRIL) 20 mg tablet; TAKE ONE TABLET BY MOUTH ONE TIME DAILY    3. DJD (degenerative joint disease), cervical    4. Menopausal syndrome (hot flashes)    5. Abnormal weight gain  -     T4, FREE  -     TSH 3RD GENERATION    6. Elevated cholesterol  -     LIPID PANEL    7. Encounter for immunization  -     Tetanus, diphtheria toxoids and acellular pertussis (TDAP) vaccine, in individuals >=7 years, IM  -     [place] IMMUNIZ ADMIN,1 SINGLE/COMB VAC/TOXOID      Follow-up Disposition:  Return in about 6 months (around 7/5/2018), or exertional SOB or CP, for F/U hypertension. lab results and schedule of future lab studies reviewed with patient  reviewed diet, exercise and weight control  cardiovascular risk and specific lipid/LDL goals reviewed  reviewed medications and side effects in detail  Please call my office if there are any questions- [phone]. I will arrange for follow up after the lab tests done from today return  Recommended a weekly \"heart check. \" I went into detail how to do this. Call for refills on medications as needed. Discussed expected course/resolution/complications of diagnosis in detail with patient. Medication risks/benefits/costs/interactions/alternatives discussed with patient. Pt was given an after visit summary which includes diagnoses, current medications & vitals. Pt expressed understanding with the diagnosis and plan. Total 25 minutes,60 % counseling re:  She at first declined the tetanus, but I encouraged her to get that done as she doesn't recall the last one; it's probably been more than 15 years. Since she travels a fair amount abroad, this way she won't have to worry about getting a tetanus when she's traveling. I discussed the benefits and risks of the tetanus shot. She agreed to that, but not to the flu shot. I did talk to her about the flu and medication that can be very effective in treating the Flu if taken early. I encouraged her to do a \"heart check\" weekly, and we talked about how to do that. Because her weight has increased a little bit, I brought that up. She states she's been traveling a little more than usual, and that's created [id number] eating habits. She's going to continue to work on that. We'll check her thyroid just to make sure it's not caused by that. Reviewed in detail the proper technique of checking the blood pressure- check it on an average day only, not on a stressful day, sitting, no exercise for at least 1 hour and not experiencing any new pain( chronic pain is OK). Patient encouraged to check BP sitting and standing at least once a month and to report these readings to me if > 140/ 90 on average , or if the standing BP is >  15 points lower than the sitting. Reviewed symptoms, or lack thereof, of hypertension and elevated cholesterol. I talked to her about her BP. She apparently finds that when she checks it at home it's a little higher, occasionally over 043 systolic, but the diastolic is always under 90. She doesn't have time to check it in the mornings so she checks it in the afternoon, and that's probably why she's getting higher readings at home. I told her that, because she is finding these readings, we probably ought to try her medication at lunchtime instead of in the morning and see if that controls it better. Another option would be half in the morning and half at lunch; if that does not work she'll let us know, and we can try other treatments.  She should continue with the weight control, low salt diet, and regular exercise. Also, discussed symptoms of concern that were noted today in the note above, treatment options( including doing nothing), when to follow up before recommended time frame. Also, answered all questions. This document was written by Monica Martinez, as dictated by Brigido Ford MD.  I have reviewed and agree with the above note and have made corrections where appropriate Mark C. Kavin Dakin M.D.

## 2018-01-05 NOTE — PROGRESS NOTES
Chief Complaint   Patient presents with    Complete Physical     1. Have you been to the ER, urgent care clinic since your last visit? Hospitalized since your last visit? No    2. Have you seen or consulted any other health care providers outside of the [address] [place] [address] since your last visit? Include any pap smears or colon screening. No    Patient declines EKG with routine physical.    Tdap vaccine given today in left deltoid with no adverse reactions.

## 2018-01-05 NOTE — PATIENT INSTRUCTIONS
Well Visit, Women 48 to 72: Care Instructions  Your Care Instructions    Physical exams can help you stay healthy. Your doctor has checked your overall health and may have suggested ways to take good care of yourself. He or she also may have recommended tests. At home, you can help prevent illness with healthy eating, regular exercise, and other steps. Follow-up care is a key part of your treatment and safety. Be sure to make and go to all appointments, and call your doctor if you are having problems. It's also a good idea to know your test results and keep a list of the medicines you take. How can you care for yourself at home? · Reach and stay at a healthy weight. This will lower your risk for many problems, such as obesity, diabetes, heart disease, and high blood pressure. · Get at least 30 minutes of exercise on most days of the week. Walking is a good choice. You also may want to do other activities, such as running, swimming, cycling, or playing tennis or team sports. · Do not smoke. Smoking can make health problems worse. If you need help quitting, talk to your doctor about stop-smoking programs and medicines. These can increase your chances of quitting for good. · Protect your skin from too much sun. When you're outdoors from 10 a.m. to 4 p.m., stay in the shade or cover up with clothing and a hat with a wide brim. Wear sunglasses that block UV rays. Even when it's cloudy, put broad-spectrum sunscreen (SPF 30 or higher) on any exposed skin. · See a dentist one or two times a year for checkups and to have your teeth cleaned. · Wear a seat belt in the car. · Limit alcohol to 1 drink a day. Too much alcohol can cause health problems. Follow your doctor's advice about when to have certain tests. These tests can spot problems early. · Cholesterol.  Your doctor will tell you how often to have this done based on your age, family history, or other things that can increase your risk for heart attack and stroke. · Blood pressure. Have your blood pressure checked during a routine doctor visit. Your doctor will tell you how often to check your blood pressure based on your age, your blood pressure results, and other factors. · Mammogram. Ask your doctor how often you should have a mammogram, which is an X-ray of your breasts. A mammogram can spot breast cancer before it can be felt and when it is easiest to treat. · Pap test and pelvic exam. Ask your doctor how often you should have a Pap test. You may not need to have a Pap test as often as you used to. · Vision. Have your eyes checked every year or two or as often as your doctor suggests. Some experts recommend that you have yearly exams for glaucoma and other age-related eye problems starting at age 48. · Hearing. Tell your doctor if you notice any change in your hearing. You can have tests to find out how well you hear. · Diabetes. Ask your doctor whether you should have tests for diabetes. · Colon cancer. You should begin tests for colon cancer at age 48. You may have one of several tests. Your doctor will tell you how often to have tests based on your age and risk. Risks include whether you already had a precancerous polyp removed from your colon or whether your parents, sisters and brothers, or children have had colon cancer. · Thyroid disease. Talk to your doctor about whether to have your thyroid checked as part of a regular physical exam. Women have an increased chance of a thyroid problem. · Osteoporosis. You should begin tests for bone density at age 72. If you are younger than 72, ask your doctor whether you have factors that may increase your risk for this disease. You may want to have this test before age 72. · Heart attack and stroke risk. At least every 4 to 6 years, you should have your risk for heart attack and stroke assessed.  Your doctor uses factors such as your age, blood pressure, cholesterol, and whether you smoke or have diabetes to show what your risk for a heart attack or stroke is over the next 10 years. When should you call for help? Watch closely for changes in your health, and be sure to contact your doctor if you have any problems or symptoms that concern you. Where can you learn more? Go to http://sherman-laya.info/. Enter T494 in the search box to learn more about \"Well Visit, Women 50 to 72: Care Instructions. \"  Current as of: May 12, 2017  Content Version: 11.4  © 0952-8251 [employer]. Care instructions adapted under license by [employer] (which disclaims liability or warranty for this information). If you have questions about a medical condition or this instruction, always ask your healthcare professional. [address] any warranty or liability for your use of this information.

## 2018-01-05 NOTE — MR AVS SNAPSHOT
Visit Information Date & Time Provider Department Dept. Phone Encounter #  
 1/5/2018  9:00 AM Bethany Shen MD [address] [place] [address] Avenue [phone] [id number] Upcoming Health Maintenance Date Due  
 BREAST CANCER SCRN MAMMOGRAM 5/19/2016 PAP AKA CERVICAL CYTOLOGY 6/17/2018 COLONOSCOPY 8/31/2025 DTaP/Tdap/Td series (2 - Td) 1/5/2028 Allergies as of 1/5/2018  Review Complete On: 1/5/2018 By: Pino Leach LPN Severity Noted Reaction Type Reactions Aspirin  03/29/2010    Nausea and Vomiting Current Immunizations  Never Reviewed No immunizations on file. Not reviewed this visit You Were Diagnosed With   
  
 Codes Comments Routine general medical examination at a health care facility    -  Primary ICD-10-CM: Z00.00 ICD-9-CM: V70.0 Essential hypertension     ICD-10-CM: I10 
ICD-9-CM: 401.9 DJD (degenerative joint disease), cervical     ICD-10-CM: M50.30 ICD-9-CM: 722.4 Menopausal syndrome (hot flashes)     ICD-10-CM: N95.1 ICD-9-CM: 627.2 Vitals BP Pulse Temp Resp Height(growth percentile) Weight(growth percentile) 118/76 (BP 1 Location: Left arm, BP Patient Position: Sitting) 75 98.1 °F (36.7 °C) (Oral) 18 5' 4\" (1.626 m) 161 lb 9.6 oz (73.3 kg) LMP SpO2 BMI OB Status Smoking Status 11/15/2010 98% 27.74 kg/m2 Postmenopausal Never Smoker BMI and BSA Data Body Mass Index Body Surface Area  
 27.74 kg/m 2 1.82 m 2 Preferred Pharmacy Pharmacy Name Phone [id number] - [place], [address] [facility] [phone] Your Updated Medication List  
  
   
This list is accurate as of: 1/5/18 10:08 AM.  Always use your most recent med list.  
  
  
  
  
 estradiol-norethindrone 1-0.5 mg per tablet Commonly known as:  Kurtis Cohen Take 1 tablet by mouth daily. lisinopril 20 mg tablet Commonly known as:  Chinedu Peck  
 TAKE ONE TABLET BY MOUTH ONE TIME DAILY Prescriptions Sent to Pharmacy Refills  
 lisinopril (PRINIVIL, ZESTRIL) 20 mg tablet 1 Sig: TAKE ONE TABLET BY MOUTH ONE TIME DAILY Class: Normal  
 [facility] [address] [place] [address], [address] [place] [address] #: [phone] We Performed the Following CBC W/O DIFF [79092 CPT(R)] LIPID PANEL [86701 CPT(R)] METABOLIC PANEL, COMPREHENSIVE [39961 CPT(R)] URINALYSIS W/MICROSCOPIC [40515 CPT(R)] Patient Instructions Well Visit, Women 48 to 72: Care Instructions Your Care Instructions Physical exams can help you stay healthy. Your doctor has checked your overall health and may have suggested ways to take good care of yourself. He or she also may have recommended tests. At home, you can help prevent illness with healthy eating, regular exercise, and other steps. Follow-up care is a key part of your treatment and safety. Be sure to make and go to all appointments, and call your doctor if you are having problems. It's also a good idea to know your test results and keep a list of the medicines you take. How can you care for yourself at home? · Reach and stay at a healthy weight. This will lower your risk for many problems, such as obesity, diabetes, heart disease, and high blood pressure. · Get at least 30 minutes of exercise on most days of the week. Walking is a good choice. You also may want to do other activities, such as running, swimming, cycling, or playing tennis or team sports. · Do not smoke. Smoking can make health problems worse. If you need help quitting, talk to your doctor about stop-smoking programs and medicines. These can increase your chances of quitting for good. · Protect your skin from too much sun. When you're outdoors from 10 a.m. to 4 p.m., stay in the shade or cover up with clothing and a hat with a wide brim. Wear sunglasses that block UV rays.  Even when it's cloudy, put broad-spectrum sunscreen (SPF 30 or higher) on any exposed skin. · See a dentist one or two times a year for checkups and to have your teeth cleaned. · Wear a seat belt in the car. · Limit alcohol to 1 drink a day. Too much alcohol can cause health problems. Follow your doctor's advice about when to have certain tests. These tests can spot problems early. · Cholesterol. Your doctor will tell you how often to have this done based on your age, family history, or other things that can increase your risk for heart attack and stroke. · Blood pressure. Have your blood pressure checked during a routine doctor visit. Your doctor will tell you how often to check your blood pressure based on your age, your blood pressure results, and other factors. · Mammogram. Ask your doctor how often you should have a mammogram, which is an X-ray of your breasts. A mammogram can spot breast cancer before it can be felt and when it is easiest to treat. · Pap test and pelvic exam. Ask your doctor how often you should have a Pap test. You may not need to have a Pap test as often as you used to. · Vision. Have your eyes checked every year or two or as often as your doctor suggests. Some experts recommend that you have yearly exams for glaucoma and other age-related eye problems starting at age 48. · Hearing. Tell your doctor if you notice any change in your hearing. You can have tests to find out how well you hear. · Diabetes. Ask your doctor whether you should have tests for diabetes. · Colon cancer. You should begin tests for colon cancer at age 48. You may have one of several tests. Your doctor will tell you how often to have tests based on your age and risk. Risks include whether you already had a precancerous polyp removed from your colon or whether your parents, sisters and brothers, or children have had colon cancer. · Thyroid disease.  Talk to your doctor about whether to have your thyroid checked as part of a regular physical exam. Women have an increased chance of a thyroid problem. · Osteoporosis. You should begin tests for bone density at age 72. If you are younger than 72, ask your doctor whether you have factors that may increase your risk for this disease. You may want to have this test before age 72. · Heart attack and stroke risk. At least every 4 to 6 years, you should have your risk for heart attack and stroke assessed. Your doctor uses factors such as your age, blood pressure, cholesterol, and whether you smoke or have diabetes to show what your risk for a heart attack or stroke is over the next 10 years. When should you call for help? Watch closely for changes in your health, and be sure to contact your doctor if you have any problems or symptoms that concern you. Where can you learn more? Go to http://sherman-laya.info/. Enter O032 in the search box to learn more about \"Well Visit, Women 50 to 72: Care Instructions. \" Current as of: May 12, 2017 Content Version: 11.4 © 8852-7534 [employer]. Care instructions adapted under license by [employer] (which disclaims liability or warranty for this information). If you have questions about a medical condition or this instruction, always ask your healthcare professional. [address] any warranty or liability for your use of this information. Introducing [facility]! Dear Barbra Saleh: Thank you for requesting a [employer] account. Our records indicate that you have previously registered for a [employer] account but its currently inactive. Please call our [employer] support line at [phone]. Additional Information If you have questions, please visit the Frequently Asked Questions section of the [employer] website at [email or web address] Remember, [employer] is NOT to be used for urgent needs. For medical emergencies, dial 911. Now available from your iPhone and Android! Please provide this summary of care documentation to your next provider. Your primary care clinician is listed as [address] [place] [address], [facility]/s . If you have any questions after today's visit, please call [phone].

## 2018-01-06 LAB
ALBUMIN SERPL-MCNC: 4.2 G/DL (ref 3.5–5.5)
ALBUMIN/GLOB SERPL: 1.5 {RATIO} (ref 1.2–2.2)
ALP SERPL-CCNC: 57 IU/L (ref 39–117)
ALT SERPL-CCNC: 19 IU/L (ref 0–32)
APPEARANCE UR: CLEAR
AST SERPL-CCNC: 17 IU/L (ref 0–40)
BACTERIA #/AREA URNS HPF: NORMAL /[HPF]
BILIRUB SERPL-MCNC: 0.2 MG/DL (ref 0–1.2)
BILIRUB UR QL STRIP: NEGATIVE
BUN SERPL-MCNC: 16 MG/DL (ref 6–24)
BUN/CREAT SERPL: 24 (ref 9–23)
CALCIUM SERPL-MCNC: 9 MG/DL (ref 8.7–10.2)
CASTS URNS QL MICRO: NORMAL /LPF
CHLORIDE SERPL-SCNC: 104 MMOL/L (ref 96–106)
CHOLEST SERPL-MCNC: 254 MG/DL (ref 100–199)
CO2 SERPL-SCNC: 25 MMOL/L (ref 18–29)
COLOR UR: YELLOW
CREAT SERPL-MCNC: 0.66 MG/DL (ref 0.57–1)
EPI CELLS #/AREA URNS HPF: NORMAL /HPF
ERYTHROCYTE [DISTWIDTH] IN BLOOD BY AUTOMATED COUNT: 14.1 % (ref 12.3–15.4)
GLOBULIN SER CALC-MCNC: 2.8 G/DL (ref 1.5–4.5)
GLUCOSE SERPL-MCNC: 86 MG/DL (ref 65–99)
GLUCOSE UR QL: NEGATIVE
HCT VFR BLD AUTO: 37.5 % (ref 34–46.6)
HDLC SERPL-MCNC: 82 MG/DL
HGB BLD-MCNC: 12.5 G/DL (ref 11.1–15.9)
HGB UR QL STRIP: NEGATIVE
INTERPRETATION, 910389: NORMAL
KETONES UR QL STRIP: NEGATIVE
LDLC SERPL CALC-MCNC: 141 MG/DL (ref 0–99)
LEUKOCYTE ESTERASE UR QL STRIP: ABNORMAL
MCH RBC QN AUTO: 30.1 PG (ref 26.6–33)
MCHC RBC AUTO-ENTMCNC: 33.3 G/DL (ref 31.5–35.7)
MCV RBC AUTO: 90 FL (ref 79–97)
MICRO URNS: ABNORMAL
MUCOUS THREADS URNS QL MICRO: PRESENT
NITRITE UR QL STRIP: NEGATIVE
PH UR STRIP: 5 [PH] (ref 5–7.5)
PLATELET # BLD AUTO: 270 X10E3/UL (ref 150–379)
POTASSIUM SERPL-SCNC: 4.4 MMOL/L (ref 3.5–5.2)
PROT SERPL-MCNC: 7 G/DL (ref 6–8.5)
PROT UR QL STRIP: NEGATIVE
RBC # BLD AUTO: 4.15 X10E6/UL (ref 3.77–5.28)
RBC #/AREA URNS HPF: NORMAL /HPF
SODIUM SERPL-SCNC: 141 MMOL/L (ref 134–144)
SP GR UR: 1.02 (ref 1–1.03)
T4 FREE SERPL-MCNC: 0.87 NG/DL (ref 0.82–1.77)
TRIGL SERPL-MCNC: 154 MG/DL (ref 0–149)
TSH SERPL DL<=0.005 MIU/L-ACNC: 1.87 UIU/ML (ref 0.45–4.5)
UROBILINOGEN UR STRIP-MCNC: 0.2 MG/DL (ref 0.2–1)
VLDLC SERPL CALC-MCNC: 31 MG/DL (ref 5–40)
WBC # BLD AUTO: 8 X10E3/UL (ref 3.4–10.8)
WBC #/AREA URNS HPF: NORMAL /HPF

## 2018-06-26 NOTE — PROGRESS NOTES
I reviewed your labs a few days after they were done in January and thought I had sent you a letter about them. I was reviewing my outstanding labs today and discovered that you were never notified of those results. I apologize. All of your recent lab tests are normal or at goal except the cholesterol numbers which are unchanged from 1 year before. . As mentioned then- the only thing recommended at this point is continued weight control and a low saturated fat diet. I would continue a yearly physical and we can recheck it then. The new cardiac risk calculator recommends cholesterol medication for you at the age of 79  If nothing else changes( earlier if you develop high blood pressure or diabetes).

## 2018-07-23 DIAGNOSIS — I10 ESSENTIAL HYPERTENSION: ICD-10-CM

## 2018-07-23 RX ORDER — LISINOPRIL 20 MG/1
TABLET ORAL
Qty: 30 TAB | Refills: 0 | Status: SHIPPED | OUTPATIENT
Start: 2018-07-23 | End: 2018-08-24 | Stop reason: SDUPTHER

## 2018-07-23 NOTE — TELEPHONE ENCOUNTER
Lab order placed. Patient states she does not know when she can do the labs.  Advised Dr Jordan Moraes gave her 30 days of med until she can get labs done

## 2018-07-23 NOTE — TELEPHONE ENCOUNTER
Patient states she was told by Dr Olivia Jasso that she can only come once a year.  She has to pay over $200 when she comes in and Dr Olivia Jasso told her because she is healthy she can just do once a year   Patient states she is tired of nurses making up rules when the Dr says something different

## 2018-07-23 NOTE — TELEPHONE ENCOUNTER
Let her know, as I told her when I agreed to this- that she is the exception to the rule and she will need to politely remind my staff of this exception( there is no way to put this in the chart and see it when she calls in for Rx). She does still need to do lab only BMP.

## 2018-08-24 DIAGNOSIS — I10 ESSENTIAL HYPERTENSION: ICD-10-CM

## 2018-08-24 RX ORDER — LISINOPRIL 20 MG/1
TABLET ORAL
Qty: 30 TAB | Refills: 4 | Status: SHIPPED | OUTPATIENT
Start: 2018-08-24 | End: 2019-01-28

## 2018-10-01 LAB
BUN SERPL-MCNC: 12 MG/DL (ref 6–24)
BUN/CREAT SERPL: 15 (ref 9–23)
CALCIUM SERPL-MCNC: 9.3 MG/DL (ref 8.7–10.2)
CHLORIDE SERPL-SCNC: 103 MMOL/L (ref 96–106)
CO2 SERPL-SCNC: 22 MMOL/L (ref 20–29)
CREAT SERPL-MCNC: 0.79 MG/DL (ref 0.57–1)
GLUCOSE SERPL-MCNC: 95 MG/DL (ref 65–99)
POTASSIUM SERPL-SCNC: 4.3 MMOL/L (ref 3.5–5.2)
SODIUM SERPL-SCNC: 140 MMOL/L (ref 134–144)

## 2018-10-02 NOTE — TELEPHONE ENCOUNTER
Outbound call to patient.  verified. Patient made aware of lab results and recommendations per Dr. Dora Chahal. Patient voiced understanding.

## 2018-10-02 NOTE — TELEPHONE ENCOUNTER
GREAT NEWS!! All of your recent lab tests are normal or at goal.  No diabetes, normal kidney tests. I would continue everything the same and schedule your physical in 6 months when your next lab work is also due.

## 2019-01-28 ENCOUNTER — OFFICE VISIT (OUTPATIENT)
Dept: FAMILY MEDICINE CLINIC | Age: 59
End: 2019-01-28

## 2019-01-28 VITALS
RESPIRATION RATE: 18 BRPM | HEART RATE: 77 BPM | WEIGHT: 159 LBS | HEIGHT: 64 IN | BODY MASS INDEX: 27.14 KG/M2 | TEMPERATURE: 98.6 F | DIASTOLIC BLOOD PRESSURE: 78 MMHG | SYSTOLIC BLOOD PRESSURE: 120 MMHG | OXYGEN SATURATION: 97 %

## 2019-01-28 DIAGNOSIS — I10 ESSENTIAL HYPERTENSION: ICD-10-CM

## 2019-01-28 DIAGNOSIS — Z23 NEED FOR SHINGLES VACCINE: ICD-10-CM

## 2019-01-28 DIAGNOSIS — M47.812 SPONDYLOSIS OF CERVICAL REGION WITHOUT MYELOPATHY OR RADICULOPATHY: ICD-10-CM

## 2019-01-28 DIAGNOSIS — N95.1 MENOPAUSAL SYNDROME (HOT FLASHES): ICD-10-CM

## 2019-01-28 DIAGNOSIS — Z00.00 PHYSICAL EXAM: Primary | ICD-10-CM

## 2019-01-28 DIAGNOSIS — E78.5 DYSLIPIDEMIA, GOAL LDL BELOW 130: ICD-10-CM

## 2019-01-28 RX ORDER — LISINOPRIL 10 MG/1
10 TABLET ORAL 2 TIMES DAILY
Qty: 60 TAB | Refills: 5 | Status: SHIPPED | OUTPATIENT
Start: 2019-01-28 | End: 2019-09-06 | Stop reason: SDUPTHER

## 2019-01-28 NOTE — PROGRESS NOTES
Chief Complaint Patient presents with  Complete Physical  
 
 
1. Have you been to the ER, urgent care clinic since your last visit? Hospitalized since your last visit? No 
 
2. Have you seen or consulted any other health care providers outside of the [address] [place] [address] since your last visit? Include any pap smears or colon screening.  No

## 2019-01-28 NOTE — PROGRESS NOTES
HISTORY OF PRESENT ILLNESS 
HPI Luis Sandoval is a 60 y.o. Female with a history of HTN, DJD, hoy flashes and elevated cholesterol, who presents at the office today for a complete physical. Pt walks regularly, and denies any issues with this activity. Pt denies unusual SOB, chest pain, and any recent ER visits or hospitalizations. Pt has not received her flu shot or the shingles vaccine. She continue to gain 1-2 lbs a year. Past Medical History:  
Diagnosis Date  DJD (degenerative joint disease), cervical 9/6/2010  Elevated cholesterol 8/09  
 HTN (hypertension) 6/09 Past Surgical History:  
Procedure Laterality Date [address] Chris Moran REFRACTIVE SURGERY  2008 Current Outpatient Medications on File Prior to Visit Medication Sig Dispense Refill  estradiol-norethindrone (ACTIVELLA) 1-0.5 mg per tablet Take 1 tablet by mouth daily. No current facility-administered medications on file prior to visit. Allergies Allergen Reactions  Aspirin Nausea and Vomiting Family History Problem Relation Age of Onset  Hypertension Mother  Stroke Mother   
     age 40  Hypertension Maternal Grandmother  Stroke Maternal Grandmother Social History Socioeconomic History  Marital status:  Spouse name: Not on file  Number of children: Not on file  Years of education: Not on file  Highest education level: Not on file Occupational History  Occupation:  Tobacco Use  Smoking status: Never Smoker  Smokeless tobacco: Never Used Substance and Sexual Activity  Alcohol use: Yes  Sexual activity: Yes  
  Partners: Male Other Topics Concern  Special Diet Yes Comment: healthy  Exercise Yes Comment: aerobic Review of Systems Constitutional: Negative for chills, diaphoresis, fever, malaise/fatigue and weight loss.   
HENT: Negative for congestion, ear discharge, ear pain, hearing loss, nosebleeds, sinus pain, sore throat and tinnitus. Eyes: Negative for blurred vision, double vision, photophobia, pain, discharge and redness. Respiratory: Negative for cough, hemoptysis, sputum production, shortness of breath, wheezing and stridor. Cardiovascular: Negative for chest pain, palpitations, orthopnea, claudication, leg swelling and PND. Gastrointestinal: Negative for abdominal pain, blood in stool, constipation, diarrhea, heartburn, melena, nausea and vomiting. Genitourinary: Negative for dysuria, flank pain, frequency, hematuria and urgency. Musculoskeletal: Negative for back pain, falls, joint pain, myalgias and neck pain. Skin: Negative for itching and rash. Neurological: Negative for dizziness, tingling, tremors, sensory change, speech change, focal weakness, seizures, loss of consciousness, weakness and headaches. Endo/Heme/Allergies: Negative for environmental allergies and polydipsia. Does not bruise/bleed easily. Psychiatric/Behavioral: Negative for depression, hallucinations, memory loss, substance abuse and suicidal ideas. The patient is not nervous/anxious and does not have insomnia. Results for orders placed or performed in visit on 07/23/18 METABOLIC PANEL, BASIC Result Value Ref Range Glucose 95 65 - 99 mg/dL BUN 12 6 - 24 mg/dL Creatinine 0.79 0.57 - 1.00 mg/dL GFR est non-AA 82 >59 mL/min/1.73 GFR est AA 95 >59 mL/min/1.73  
 BUN/Creatinine ratio 15 9 - 23 Sodium 140 134 - 144 mmol/L Potassium 4.3 3.5 - 5.2 mmol/L Chloride 103 96 - 106 mmol/L  
 CO2 22 20 - 29 mmol/L Calcium 9.3 8.7 - 10.2 mg/dL Physical Exam 
Visit Vitals /78 (BP 1 Location: Right arm, BP Patient Position: Sitting) Pulse 77 Temp 98.6 °F (37 °C) (Oral) Resp 18 Ht 5' 4\" (1.626 m) Wt 159 lb (72.1 kg) LMP 11/15/2010 SpO2 97% BMI 27.29 kg/m² General:  Alert, cooperative, no distress, appears stated age. Head:  Normocephalic, without obvious abnormality, atraumatic. Eyes:  Conjunctivae/corneas clear. PERRL, EOMs intact. Fundi benign. Ears:  Normal TMs and external ear canals both ears. Nose: Nares normal. Septum midline. Mucosa normal. No drainage or sinus tenderness. Throat: Lips, mucosa, and tongue normal. Teeth and gums normal.  
Neck: Supple, symmetrical, trachea midline, no adenopathy, thyroid: no enlargement/tenderness/nodules, no carotid bruit and no JVD. Back:   Symmetric, no curvature. ROM normal. No CVA tenderness. Lungs:   Clear to auscultation bilaterally. Chest wall:  No tenderness or deformity. Heart:  Regular rate and rhythm, S1, S2 normal, no murmur, click, rub or gallop. Abdomen:   Soft, non-tender. Bowel sounds normal. No masses,  No organomegaly. Extremities: Extremities normal, atraumatic, no cyanosis or edema. Pulses: 2+ and symmetric all extremities. Skin: Skin color, texture, turgor normal. No rashes or lesions. Lymph nodes: Cervical, supraclavicular, and axillary nodes normal.  
Neurologic: CNII-XII intact. Normal strength, sensation and reflexes throughout. ASSESSMENT and PLAN 
  ICD-10-CM ICD-9-CM 1. Physical exam Z00.00 V70.9 LIPID PANEL  
   METABOLIC PANEL, COMPREHENSIVE  
   CBC W/O DIFF  
   URINALYSIS W/ RFLX MICROSCOPIC  
   CANCELED: AMB POC EKG ROUTINE W/ 12 LEADS, INTER & REP 2. Essential hypertension I10 401.9 LIPID PANEL  
   METABOLIC PANEL, COMPREHENSIVE  
   lisinopril (PRINIVIL, ZESTRIL) 10 mg tablet 3. Need for shingles vaccine Z23 V04.89 varicella-zoster recombinant, PF, (SHINGRIX, PF,) 50 mcg/0.5 mL susr injection 4. Spondylosis of cervical region without myelopathy or radiculopathy M47.812 721.0 5. Dyslipidemia, goal LDL below 130 E78.5 272.4 6. Menopausal syndrome (hot flashes) N95.1 627.2 Diagnoses and all orders for this visit: 1.  Physical exam 
-     LIPID PANEL 
-     METABOLIC PANEL, COMPREHENSIVE 
 -     CBC W/O DIFF 
-     URINALYSIS W/ RFLX MICROSCOPIC 2. Essential hypertension -     LIPID PANEL 
-     METABOLIC PANEL, COMPREHENSIVE 
-     lisinopril (PRINIVIL, ZESTRIL) 10 mg tablet; Take 1 Tab by mouth two (2) times a day. 3. Need for shingles vaccine 
-     varicella-zoster recombinant, PF, (SHINGRIX, PF,) 50 mcg/0.5 mL susr injection; 0.5 mL by IntraMUSCular route once for 1 dose. 4. Spondylosis of cervical region without myelopathy or radiculopathy 5. Dyslipidemia, goal LDL below 130 
 
6. Menopausal syndrome (hot flashes) Other orders 
-     varicella-zoster recombinant, PF, (SHINGRIX, PF,) 50 mcg/0.5 mL susr injection; 0.5 mL by IntraMUSCular route once for 1 dose. Follow-up Disposition: 
Return in about 6 months (around 7/28/2019), or BP OOC or further weight gain, for F/U hypertension, F/U weight concerns. lab results and schedule of future lab studies reviewed with patient 
reviewed diet, exercise and weight control 
cardiovascular risk and specific lipid/LDL goals reviewed 
reviewed medications and side effects in detail Please call my office if there are any questions- [phone]. I will arrange for follow up after the lab tests done from today return Recommended a weekly \"heart check. \" I went into detail how to do this. Call for refills on medications as needed. Discussed expected course/resolution/complications of diagnosis in detail with patient. Medication risks/benefits/costs/interactions/alternatives discussed with patient. Pt was given an after visit summary which includes diagnoses, current medications & vitals. Pt expressed understanding with the diagnosis and plan Discussed her gradual weight gain in the past few years and ways to reverse that. Recommended a weekly \"heart check. \" I went into detail how to do this.  
 
 Regular exercise is very important to your health; it helps mentally, physically, socially; it prevents injuries if done properly. Exercise, even as simple as walking 20-30 minutes daily has major benefits to your health even though your \"numbers\" are the same in the lab. See if you can add this into your daily regimen and after a few months it will become a regular habit-\"just something you do,\" like brushing your teeth. A combination of aerobic exercise and strengthening and stretching is felt to be the best for you, so this should be your ultimate goal.  
This can be done in the privacy of your home or in a group setting as at the gym  Some prefer having a , others prefer to do exercise in groups or individually. Do what \"works\" for you. You need to make it simple and \"fun,\" or you most likely you will not continue it. BMI is significantly elevated- in the overweight range. I reviewed diet, exercise and weight control. Discussed weight control in detail, the importance of mainly decreased carbs, and for weight maintenance, exercise; discussed different diets and that it isn't as important to watch the type of foods as it is to decrease calorie intake no matter what type of diet you do, etc.  
 
Reviewed symptoms, or lack thereof, of hypertension and elevated cholesterol. Reviewed in detail the proper technique of checking the blood pressure- check it on an average day only, not on a stressful day, sitting, no exercise for at least 1 hour and not experiencing any new pain( chronic pain is OK). Patient encouraged to check BP sitting and standing at least once a month and to report these readings to me if > 140/ 90 on average , or if the standing BP is >  15 points lower than the sitting. I reminded her to do a weekly \"heart check\". I encouraged her to have her first colonoscopy. She has her Pap smear done regularly at the [facility].  Pt also has regular mammograms, which have always been normal.  
 
 Because it seems to work better for her, we changed her lisinopril to 10 mg BID instead of 20 mg qday. She refused the EKG, as her insurance did not cover it on her last physical.  
 
This document was written by Debby Licea, as dictated by Hermelinda Holloway MD. 
I have reviewed and agree with the above note and have made corrections where appropriate Walter Salas M.D.

## 2019-02-03 LAB
ALBUMIN SERPL-MCNC: 4.6 G/DL (ref 3.5–5.5)
ALBUMIN/GLOB SERPL: 1.8 {RATIO} (ref 1.2–2.2)
ALP SERPL-CCNC: 65 IU/L (ref 39–117)
ALT SERPL-CCNC: 14 IU/L (ref 0–32)
APPEARANCE UR: CLEAR
AST SERPL-CCNC: 16 IU/L (ref 0–40)
BACTERIA #/AREA URNS HPF: NORMAL /[HPF]
BILIRUB SERPL-MCNC: 0.3 MG/DL (ref 0–1.2)
BILIRUB UR QL STRIP: NEGATIVE
BUN SERPL-MCNC: 12 MG/DL (ref 6–24)
BUN/CREAT SERPL: 14 (ref 9–23)
CALCIUM SERPL-MCNC: 9.6 MG/DL (ref 8.7–10.2)
CASTS URNS QL MICRO: NORMAL /LPF
CHLORIDE SERPL-SCNC: 104 MMOL/L (ref 96–106)
CHOLEST SERPL-MCNC: 259 MG/DL (ref 100–199)
CO2 SERPL-SCNC: 22 MMOL/L (ref 20–29)
COLOR UR: YELLOW
CREAT SERPL-MCNC: 0.86 MG/DL (ref 0.57–1)
EPI CELLS #/AREA URNS HPF: NORMAL /HPF
ERYTHROCYTE [DISTWIDTH] IN BLOOD BY AUTOMATED COUNT: 13.9 % (ref 12.3–15.4)
GLOBULIN SER CALC-MCNC: 2.5 G/DL (ref 1.5–4.5)
GLUCOSE SERPL-MCNC: 97 MG/DL (ref 65–99)
GLUCOSE UR QL: NEGATIVE
HCT VFR BLD AUTO: 39.5 % (ref 34–46.6)
HDLC SERPL-MCNC: 68 MG/DL
HGB BLD-MCNC: 13.2 G/DL (ref 11.1–15.9)
HGB UR QL STRIP: NEGATIVE
INTERPRETATION, 910389: NORMAL
KETONES UR QL STRIP: NEGATIVE
LDLC SERPL CALC-MCNC: 154 MG/DL (ref 0–99)
LEUKOCYTE ESTERASE UR QL STRIP: ABNORMAL
MCH RBC QN AUTO: 30.5 PG (ref 26.6–33)
MCHC RBC AUTO-ENTMCNC: 33.4 G/DL (ref 31.5–35.7)
MCV RBC AUTO: 91 FL (ref 79–97)
MICRO URNS: ABNORMAL
MUCOUS THREADS URNS QL MICRO: PRESENT
NITRITE UR QL STRIP: NEGATIVE
PH UR STRIP: 5 [PH] (ref 5–7.5)
PLATELET # BLD AUTO: 292 X10E3/UL (ref 150–379)
POTASSIUM SERPL-SCNC: 4.3 MMOL/L (ref 3.5–5.2)
PROT SERPL-MCNC: 7.1 G/DL (ref 6–8.5)
PROT UR QL STRIP: NEGATIVE
RBC # BLD AUTO: 4.33 X10E6/UL (ref 3.77–5.28)
RBC #/AREA URNS HPF: NORMAL /HPF
SODIUM SERPL-SCNC: 141 MMOL/L (ref 134–144)
SP GR UR: 1.02 (ref 1–1.03)
TRIGL SERPL-MCNC: 186 MG/DL (ref 0–149)
UROBILINOGEN UR STRIP-MCNC: 0.2 MG/DL (ref 0.2–1)
VLDLC SERPL CALC-MCNC: 37 MG/DL (ref 5–40)
WBC # BLD AUTO: 6 X10E3/UL (ref 3.4–10.8)
WBC #/AREA URNS HPF: NORMAL /HPF

## 2019-04-18 NOTE — PROGRESS NOTES
I reviewed your labs a few days after they were done and thought I had sent you a letter about them. I was reviewing my outstanding labs today and discovered that you were never notified of those results. I apologize. GREAT NEWS!! All of your lab tests are normal or at goal except the cholesterol but because your risk of heart attack and stroke is very low at this point, there is no need to take medication for this until your risk is  7.5 % chance of one of these which by the cardiovascular risk formula will happen around the age of 79. No diabetes or leukemia,, normal liver and kidney tests. I would continue everything the same and schedule your next fasting office visit in 6 months.

## 2019-09-06 DIAGNOSIS — I10 ESSENTIAL HYPERTENSION: ICD-10-CM

## 2019-09-09 RX ORDER — LISINOPRIL 10 MG/1
TABLET ORAL
Qty: 60 TAB | Refills: 4 | Status: SHIPPED | OUTPATIENT
Start: 2019-09-09 | End: 2020-02-19 | Stop reason: SDUPTHER

## 2019-09-09 NOTE — TELEPHONE ENCOUNTER
PCP: Marge Askew MD    Last appt: 2/2/2019  Future Appointments   Date Time Provider Juan Calvillo   2/19/2020  9:00 AM [facility]       Requested Prescriptions     Pending Prescriptions Disp Refills    lisinopril (PRINIVIL, ZESTRIL) 10 mg tablet [Pharmacy Med Name: Lisinopril Oral Tablet 10 MG] 60 Tab 4     Sig: TAKE 1 TABLET BY MOUTH TWICE DAILY

## 2020-02-19 ENCOUNTER — OFFICE VISIT (OUTPATIENT)
Dept: FAMILY MEDICINE CLINIC | Age: 60
End: 2020-02-19

## 2020-02-19 VITALS
SYSTOLIC BLOOD PRESSURE: 118 MMHG | HEART RATE: 89 BPM | WEIGHT: 156 LBS | HEIGHT: 64 IN | DIASTOLIC BLOOD PRESSURE: 70 MMHG | RESPIRATION RATE: 16 BRPM | BODY MASS INDEX: 26.63 KG/M2 | OXYGEN SATURATION: 97 % | TEMPERATURE: 97.6 F

## 2020-02-19 DIAGNOSIS — Z00.00 PHYSICAL EXAM: Primary | ICD-10-CM

## 2020-02-19 DIAGNOSIS — E78.5 DYSLIPIDEMIA, GOAL LDL BELOW 130: ICD-10-CM

## 2020-02-19 DIAGNOSIS — E55.9 VITAMIN D DEFICIENCY: ICD-10-CM

## 2020-02-19 DIAGNOSIS — I10 ESSENTIAL HYPERTENSION: ICD-10-CM

## 2020-02-19 DIAGNOSIS — N95.1 MENOPAUSAL SYNDROME (HOT FLASHES): ICD-10-CM

## 2020-02-19 RX ORDER — LISINOPRIL 10 MG/1
TABLET ORAL
Qty: 60 TAB | Refills: 5 | Status: SHIPPED | OUTPATIENT
Start: 2020-02-19 | End: 2020-08-31 | Stop reason: SDUPTHER

## 2020-02-19 NOTE — PROGRESS NOTES
Chief Complaint   Patient presents with    Complete Physical     fasting    1. Have you been to the ER, urgent care clinic since your last visit? Hospitalized since your last visit? No    2. Have you seen or consulted any other health care providers outside of the [address] [place] [address] since your last visit? Include any pap smears or colon screening.  No   Had pap and mammo by gyn but does not want records sent here  Does not want EKG done

## 2020-02-19 NOTE — PROGRESS NOTES
HISTORY OF PRESENT ILLNESS  HPI  Yarely Pascual is a 60 y.o. female with a history of HTN, cervical spondylosis, menopausal syndrome and hyperlipidemia with LDL goal <130, who presents to the office today for a complete physical. Pt is fasting today. Pt reports doing her pap smear and mammogram regularly. Pt denies unusual SOB, chest pain, and any recent ER visits or hospitalizations. Past Medical History:   Diagnosis Date    DJD (degenerative joint disease), cervical 9/6/2010    Elevated cholesterol 8/09    HTN (hypertension) 6/09     Past Surgical History:   Procedure Laterality Date    HX APPENDECTOMY  1985    HX REFRACTIVE SURGERY  2008     Current Outpatient Medications on File Prior to Visit   Medication Sig Dispense Refill    estradiol-norethindrone (ACTIVELLA) 1-0.5 mg per tablet Take 1 tablet by mouth daily.  [DISCONTINUED] lisinopril (PRINIVIL, ZESTRIL) 10 mg tablet TAKE 1 TABLET BY MOUTH TWICE DAILY 60 Tab 4     No current facility-administered medications on file prior to visit. Allergies   Allergen Reactions    Aspirin Nausea and Vomiting     Family History   Problem Relation Age of Onset    Hypertension Mother     Stroke Mother         age 40    Hypertension Maternal Grandmother     Stroke Maternal Grandmother      Social History     Socioeconomic History    Marital status:      Spouse name: Not on file    Number of children: Not on file    Years of education: Not on file    Highest education level: Not on file   Occupational History    Occupation:    Tobacco Use    Smoking status: Never Smoker    Smokeless tobacco: Never Used   Substance and Sexual Activity    Alcohol use: Yes    Sexual activity: Yes     Partners: Male   Other Topics Concern    Special Diet Yes     Comment: healthy    Exercise Yes     Comment: aerobic             Review of Systems   Constitutional: Negative for chills, diaphoresis, fever, malaise/fatigue and weight loss. HENT: Negative for congestion, ear discharge, ear pain, hearing loss, nosebleeds, sore throat and tinnitus. Eyes: Negative for blurred vision, double vision, photophobia, pain, discharge and redness. Respiratory: Negative for cough, hemoptysis, sputum production, shortness of breath, wheezing and stridor. Cardiovascular: Negative for chest pain, palpitations, orthopnea, claudication, leg swelling and PND. Gastrointestinal: Negative for abdominal pain, blood in stool, constipation, diarrhea, heartburn, melena, nausea and vomiting. Genitourinary: Negative for dysuria, flank pain, frequency, hematuria and urgency. Musculoskeletal: Negative for back pain, falls, joint pain, myalgias and neck pain. Skin: Negative for itching and rash. Neurological: Negative for dizziness, tingling, tremors, sensory change, speech change, focal weakness, seizures, loss of consciousness, weakness and headaches. Endo/Heme/Allergies: Negative for environmental allergies and polydipsia. Does not bruise/bleed easily. Psychiatric/Behavioral: Negative for depression, hallucinations, memory loss, substance abuse and suicidal ideas. The patient is not nervous/anxious and does not have insomnia.       Results for orders placed or performed in visit on 01/28/19   LIPID PANEL   Result Value Ref Range    Cholesterol, total 259 (H) 100 - 199 mg/dL    Triglyceride 186 (H) 0 - 149 mg/dL    HDL Cholesterol 68 >39 mg/dL    VLDL, calculated 37 5 - 40 mg/dL    LDL, calculated 154 (H) 0 - 99 mg/dL   METABOLIC PANEL, COMPREHENSIVE   Result Value Ref Range    Glucose 97 65 - 99 mg/dL    BUN 12 6 - 24 mg/dL    Creatinine 0.86 0.57 - 1.00 mg/dL    GFR est non-AA 74 >59 mL/min/1.73    GFR est AA 86 >59 mL/min/1.73    BUN/Creatinine ratio 14 9 - 23    Sodium 141 134 - 144 mmol/L    Potassium 4.3 3.5 - 5.2 mmol/L    Chloride 104 96 - 106 mmol/L    CO2 22 20 - 29 mmol/L    Calcium 9.6 8.7 - 10.2 mg/dL    Protein, total 7.1 6.0 - 8.5 g/dL Albumin 4.6 3.5 - 5.5 g/dL    GLOBULIN, TOTAL 2.5 1.5 - 4.5 g/dL    A-G Ratio 1.8 1.2 - 2.2    Bilirubin, total 0.3 0.0 - 1.2 mg/dL    Alk. phosphatase 65 39 - 117 IU/L    AST (SGOT) 16 0 - 40 IU/L    ALT (SGPT) 14 0 - 32 IU/L   CBC W/O DIFF   Result Value Ref Range    WBC 6.0 3.4 - 10.8 x10E3/uL    RBC 4.33 3.77 - 5.28 x10E6/uL    HGB 13.2 11.1 - 15.9 g/dL    HCT 39.5 34.0 - 46.6 %    MCV 91 79 - 97 fL    MCH 30.5 26.6 - 33.0 pg    MCHC 33.4 31.5 - 35.7 g/dL    RDW 13.9 12.3 - 15.4 %    PLATELET 095 630 - 135 x10E3/uL   URINALYSIS W/ RFLX MICROSCOPIC   Result Value Ref Range    Specific Gravity 1.024 1.005 - 1.030    pH (UA) 5.0 5.0 - 7.5    Color Yellow Yellow    Appearance Clear Clear    Leukocyte Esterase Trace (A) Negative    Protein Negative Negative/Trace    Glucose Negative Negative    Ketone Negative Negative    Blood Negative Negative    Bilirubin Negative Negative    Urobilinogen 0.2 0.2 - 1.0 mg/dL    Nitrites Negative Negative    Microscopic Examination See additional order    MICROSCOPIC EXAMINATION   Result Value Ref Range    WBC 0-5 0 - 5 /hpf    RBC 0-2 0 - 2 /hpf    Epithelial cells 0-10 0 - 10 /hpf    Casts None seen None seen /lpf    Mucus Present Not Estab. Bacteria Few None seen/Few   CVD REPORT   Result Value Ref Range    INTERPRETATION Note          Physical Exam  Visit Vitals  /70   Pulse 89   Temp 97.6 °F (36.4 °C)   Resp 16   Ht 5' 4\" (1.626 m)   Wt 156 lb (70.8 kg)   LMP 11/15/2010   SpO2 97%   BMI 26.78 kg/m²       General:  Alert, cooperative, no distress, appears stated age. Head:  Normocephalic, without obvious abnormality, atraumatic. Eyes:  Conjunctivae/corneas clear. PERRL, EOMs intact. Fundi benign. Ears:  Normal TMs and external ear canals both ears. Nose: Nares normal. Septum midline. Mucosa normal. No drainage or sinus tenderness.    Throat: Lips, mucosa, and tongue normal. Teeth and gums normal.   Neck: Supple, symmetrical, trachea midline, no adenopathy, thyroid: no enlargement/tenderness/nodules, no carotid bruit and no JVD. Back:   Symmetric, no curvature. ROM normal. No CVA tenderness. Lungs:   Clear to auscultation bilaterally. Chest wall:  No tenderness or deformity. Heart:  Regular rate and rhythm, S1, S2 normal, no murmur, click, rub or gallop. Abdomen:   Soft, non-tender. Bowel sounds normal. No masses,  No organomegaly. Extremities: Extremities normal, atraumatic, no cyanosis or edema. Pulses: 2+ and symmetric all extremities. Skin: Skin color, texture, turgor normal. No rashes or lesions. Lymph nodes: Cervical, supraclavicular, and axillary nodes normal.   Neurologic: CNII-XII intact. Normal strength, sensation and reflexes throughout. ASSESSMENT and PLAN    ICD-10-CM ICD-9-CM    1. Physical exam Z00.00 V70.9 LIPID PANEL      METABOLIC PANEL, COMPREHENSIVE      CBC W/O DIFF      URINALYSIS W/ RFLX MICROSCOPIC      VITAMIN D, 25 HYDROXY   2. Dyslipidemia, goal LDL below 130 E78.5 272.4    3. Essential hypertension I10 401.9 lisinopril (PRINIVIL, ZESTRIL) 10 mg tablet   4. Menopausal syndrome (hot flashes) N95.1 627.2    5. Vitamin D deficiency E55.9 268.9 VITAMIN D, 25 HYDROXY     Diagnoses and all orders for this visit:    1. Physical exam  -     LIPID PANEL  -     METABOLIC PANEL, COMPREHENSIVE  -     CBC W/O DIFF  -     URINALYSIS W/ RFLX MICROSCOPIC  -     VITAMIN D, 25 HYDROXY    2. Dyslipidemia, goal LDL below 130    3. Essential hypertension  -     lisinopril (PRINIVIL, ZESTRIL) 10 mg tablet; TAKE 1 TABLET BY MOUTH TWICE DAILY    4. Menopausal syndrome (hot flashes)    5.  Vitamin D deficiency  -     VITAMIN D, 25 HYDROXY      Follow-up and Dispositions    · Return in about 1 year (around 2/19/2021), or BP OOC, for F/U HTN and CHOL, physical.       lab results and schedule of future lab studies reviewed with patient  reviewed diet, exercise and weight control  cardiovascular risk and specific lipid/LDL goals reviewed  reviewed medications and side effects in detail  Please call my office if there are any questions- [phone]. I will arrange for follow up after the lab tests done from today return  Recommended a weekly \"heart check. \" I went into detail how to do this. Call for refills on medications as needed. Discussed expected course/resolution/complications of diagnosis in detail with patient. Medication risks/benefits/costs/interactions/alternatives discussed with patient. Pt was given an after visit summary which includes diagnoses, current medications & vitals. Pt expressed understanding with the diagnosis and plan    Total 25 minutes,60 % counseling re: Review of  the proper technique of checking the blood pressure- check it on an average day only, not on a stressful day, sitting, no exercise for at least 1 hour and not experiencing any new pain( chronic pain is OK). Patient encouraged to check BP sitting and standing at least once a month and to report these readings to me if > 140/ 90 on average , or if the standing BP is >  15 points lower than the sitting. Always check it twice and if there is > 5 points decrease from the previous reading( top reading or systolic) keep checking it until it does not drop 5 points. Write only this final reading down, not the preceding readings. If out of these readings there is only 1 out of 4 or less > 310, or > 90 diastolic then your blood pressure is OK; it needs further treatment if it is above this. Also, don't forget,  as noted above, to check your blood pressure standing once a month; this is to detect a drop in your BP that might lead to fainting and serious injury; you check it standing with your arm hanging straight down and relaxed. Check it twice waiting 1 minute between the two readings. If, with either one of these 2 readings there is a > 15 point drop of the systolic compared to your sitting pressure( done before the standing BP), then let me know. Following these guidelines, continue to check your BP and write down only the ones described above and it will help me to effectively treat your blood pressure. Recommended a weekly \"heart check. \" I went into detail how to do this. Regular exercise is very important to your health; it helps mentally, physically, socially; it prevents injuries if done properly. Exercise, even as simple as walking 20-30 minutes daily has major benefits to your health even though your \"numbers\" are the same in the lab. See if you can add this into your daily regimen and after a few months it will become a regular habit-\"just something you do,\" like brushing your teeth. A combination of aerobic exercise and strengthening and stretching is felt to be the best for you, so this should be your ultimate goal.   This can be done in the privacy of your home or in a group setting as at the gym  Some prefer having a , others prefer to do exercise in groups or individually. Do what \"works\" for you. You need to make it simple and \"fun,\" or you most likely will not continue it. Reviewed symptoms, or lack thereof, of hypertension and elevated cholesterol. Also, discussed symptoms of concern that were noted today in the note above, treatment options( including doing nothing), when to follow up before recommended time frame. Also, answered all questions. BMI is significantly elevated- in the overweight range. I reviewed diet, exercise and weight control. Discussed weight control in detail, the importance of mainly decreased carbs, and for weight maintenance, exercise; discussed different diets and that it isn't as important to watch the type of foods as it is to decrease calorie intake no matter what type of diet you do, etc.     Talked about further weight loss and different diets. We checked her vitamin D as well, as she admits to consuming a diet low in vitamin D.     This document was written by Loco Rollins, [address] [facility] [address], as dictated by Bibi Trotter MD.  I have reviewed and agree with the above note and have made corrections where appropriate Walter Polo M.D.

## 2020-02-20 LAB
25(OH)D3+25(OH)D2 SERPL-MCNC: 15 NG/ML (ref 30–100)
ALBUMIN SERPL-MCNC: 4.8 G/DL (ref 3.8–4.9)
ALBUMIN/GLOB SERPL: 1.8 {RATIO} (ref 1.2–2.2)
ALP SERPL-CCNC: 58 IU/L (ref 39–117)
ALT SERPL-CCNC: 19 IU/L (ref 0–32)
APPEARANCE UR: CLEAR
AST SERPL-CCNC: 12 IU/L (ref 0–40)
BILIRUB SERPL-MCNC: 0.3 MG/DL (ref 0–1.2)
BILIRUB UR QL STRIP: NEGATIVE
BUN SERPL-MCNC: 21 MG/DL (ref 8–27)
BUN/CREAT SERPL: 23 (ref 12–28)
CALCIUM SERPL-MCNC: 9.9 MG/DL (ref 8.7–10.3)
CHLORIDE SERPL-SCNC: 106 MMOL/L (ref 96–106)
CHOLEST SERPL-MCNC: 293 MG/DL (ref 100–199)
CO2 SERPL-SCNC: 23 MMOL/L (ref 20–29)
COLOR UR: YELLOW
COMMENT, 011824: ABNORMAL
CREAT SERPL-MCNC: 0.93 MG/DL (ref 0.57–1)
ERYTHROCYTE [DISTWIDTH] IN BLOOD BY AUTOMATED COUNT: 13.4 % (ref 11.7–15.4)
GLOBULIN SER CALC-MCNC: 2.7 G/DL (ref 1.5–4.5)
GLUCOSE SERPL-MCNC: 107 MG/DL (ref 65–99)
GLUCOSE UR QL: NEGATIVE
HCT VFR BLD AUTO: 42.1 % (ref 34–46.6)
HDLC SERPL-MCNC: 75 MG/DL
HGB BLD-MCNC: 14.1 G/DL (ref 11.1–15.9)
HGB UR QL STRIP: NEGATIVE
INTERPRETATION, 910389: NORMAL
KETONES UR QL STRIP: NEGATIVE
LDLC SERPL CALC-MCNC: 190 MG/DL (ref 0–99)
LEUKOCYTE ESTERASE UR QL STRIP: NEGATIVE
MCH RBC QN AUTO: 30.1 PG (ref 26.6–33)
MCHC RBC AUTO-ENTMCNC: 33.5 G/DL (ref 31.5–35.7)
MCV RBC AUTO: 90 FL (ref 79–97)
MICRO URNS: NORMAL
NITRITE UR QL STRIP: NEGATIVE
PH UR STRIP: 5 [PH] (ref 5–7.5)
PLATELET # BLD AUTO: 255 X10E3/UL (ref 150–450)
POTASSIUM SERPL-SCNC: 4.9 MMOL/L (ref 3.5–5.2)
PROT SERPL-MCNC: 7.5 G/DL (ref 6–8.5)
PROT UR QL STRIP: NEGATIVE
RBC # BLD AUTO: 4.69 X10E6/UL (ref 3.77–5.28)
SODIUM SERPL-SCNC: 142 MMOL/L (ref 134–144)
SP GR UR: 1.02 (ref 1–1.03)
TRIGL SERPL-MCNC: 140 MG/DL (ref 0–149)
UROBILINOGEN UR STRIP-MCNC: 0.2 MG/DL (ref 0.2–1)
VLDLC SERPL CALC-MCNC: 28 MG/DL (ref 5–40)
WBC # BLD AUTO: 6.1 X10E3/UL (ref 3.4–10.8)

## 2020-05-20 ENCOUNTER — TELEPHONE (OUTPATIENT)
Dept: FAMILY MEDICINE CLINIC | Age: 60
End: 2020-05-20

## 2020-05-20 NOTE — TELEPHONE ENCOUNTER
Pt wants Dr Herminia Hahn to know that her BP for past couple of weeks has been between 140 and 160. Pt wants Dr. Hreminia Hahn to change her BP medication if possible.     BCB: [phone] (M)

## 2020-05-21 NOTE — TELEPHONE ENCOUNTER
Outbound call to patient. Patient does not have exact numbers of blood pressure readings. Advised patient to over the next few days to take blood pressures and write down the numbers and give us a call back with those readings. Patient verbalized understanding.

## 2020-05-22 ENCOUNTER — HOSPITAL ENCOUNTER (EMERGENCY)
Age: 60
Discharge: HOME | End: 2020-05-22
Attending: EMERGENCY MEDICINE
Payer: COMMERCIAL

## 2020-05-22 VITALS
TEMPERATURE: 98 F | RESPIRATION RATE: 16 BRPM | SYSTOLIC BLOOD PRESSURE: 154 MMHG | OXYGEN SATURATION: 98 % | HEART RATE: 78 BPM | DIASTOLIC BLOOD PRESSURE: 80 MMHG

## 2020-05-22 DIAGNOSIS — I10 HYPERTENSION, UNSPECIFIED TYPE: Primary | ICD-10-CM

## 2020-05-22 LAB
ALBUMIN SERPL-MCNC: 3.8 G/DL (ref 3.5–5)
ALBUMIN/GLOB SERPL: 1.1 {RATIO} (ref 1.1–2.2)
ALP SERPL-CCNC: 60 U/L (ref 45–117)
ALT SERPL-CCNC: 24 U/L (ref 12–78)
ANION GAP SERPL CALC-SCNC: 4 MMOL/L (ref 5–15)
APPEARANCE UR: CLEAR
AST SERPL-CCNC: 10 U/L (ref 15–37)
BACTERIA URNS QL MICRO: NEGATIVE /HPF
BASOPHILS # BLD: 0.1 K/UL (ref 0–0.1)
BASOPHILS NFR BLD: 1 % (ref 0–1)
BILIRUB SERPL-MCNC: 0.2 MG/DL (ref 0.2–1)
BILIRUB UR QL: NEGATIVE
BUN SERPL-MCNC: 17 MG/DL (ref 6–20)
BUN/CREAT SERPL: 21 (ref 12–20)
CALCIUM SERPL-MCNC: 9.2 MG/DL (ref 8.5–10.1)
CHLORIDE SERPL-SCNC: 113 MMOL/L (ref 97–108)
CO2 SERPL-SCNC: 24 MMOL/L (ref 21–32)
COLOR UR: NORMAL
CREAT SERPL-MCNC: 0.8 MG/DL (ref 0.55–1.02)
DIFFERENTIAL METHOD BLD: NORMAL
EOSINOPHIL # BLD: 0.1 K/UL (ref 0–0.4)
EOSINOPHIL NFR BLD: 2 % (ref 0–7)
EPITH CASTS URNS QL MICRO: NORMAL /LPF
ERYTHROCYTE [DISTWIDTH] IN BLOOD BY AUTOMATED COUNT: 13.4 % (ref 11.5–14.5)
GLOBULIN SER CALC-MCNC: 3.4 G/DL (ref 2–4)
GLUCOSE SERPL-MCNC: 112 MG/DL (ref 65–100)
GLUCOSE UR STRIP.AUTO-MCNC: NEGATIVE MG/DL
HCT VFR BLD AUTO: 39.5 % (ref 35–47)
HGB BLD-MCNC: 12.6 G/DL (ref 11.5–16)
HGB UR QL STRIP: NEGATIVE
HYALINE CASTS URNS QL MICRO: NORMAL /LPF (ref 0–5)
IMM GRANULOCYTES # BLD AUTO: 0 K/UL (ref 0–0.04)
IMM GRANULOCYTES NFR BLD AUTO: 0 % (ref 0–0.5)
KETONES UR QL STRIP.AUTO: NEGATIVE MG/DL
LEUKOCYTE ESTERASE UR QL STRIP.AUTO: NEGATIVE
LYMPHOCYTES # BLD: 1.8 K/UL (ref 0.8–3.5)
LYMPHOCYTES NFR BLD: 23 % (ref 12–49)
MCH RBC QN AUTO: 30 PG (ref 26–34)
MCHC RBC AUTO-ENTMCNC: 31.9 G/DL (ref 30–36.5)
MCV RBC AUTO: 94 FL (ref 80–99)
MONOCYTES # BLD: 0.7 K/UL (ref 0–1)
MONOCYTES NFR BLD: 9 % (ref 5–13)
NEUTS SEG # BLD: 5 K/UL (ref 1.8–8)
NEUTS SEG NFR BLD: 65 % (ref 32–75)
NITRITE UR QL STRIP.AUTO: NEGATIVE
NRBC # BLD: 0 K/UL (ref 0–0.01)
NRBC BLD-RTO: 0 PER 100 WBC
PH UR STRIP: 6 [PH] (ref 5–8)
PLATELET # BLD AUTO: 226 K/UL (ref 150–400)
PMV BLD AUTO: 11.7 FL (ref 8.9–12.9)
POTASSIUM SERPL-SCNC: 3.8 MMOL/L (ref 3.5–5.1)
PROT SERPL-MCNC: 7.2 G/DL (ref 6.4–8.2)
PROT UR STRIP-MCNC: NEGATIVE MG/DL
RBC # BLD AUTO: 4.2 M/UL (ref 3.8–5.2)
RBC #/AREA URNS HPF: NORMAL /HPF (ref 0–5)
SODIUM SERPL-SCNC: 141 MMOL/L (ref 136–145)
SP GR UR REFRACTOMETRY: 1 (ref 1–1.03)
UR CULT HOLD, URHOLD: NORMAL
UROBILINOGEN UR QL STRIP.AUTO: 0.2 EU/DL (ref 0.2–1)
WBC # BLD AUTO: 7.7 K/UL (ref 3.6–11)
WBC URNS QL MICRO: NORMAL /HPF (ref 0–4)

## 2020-05-22 PROCEDURE — 81001 URINALYSIS AUTO W/SCOPE: CPT

## 2020-05-22 PROCEDURE — 99283 EMERGENCY DEPT VISIT LOW MDM: CPT

## 2020-05-22 PROCEDURE — 80053 COMPREHEN METABOLIC PANEL: CPT

## 2020-05-22 PROCEDURE — 36415 COLL VENOUS BLD VENIPUNCTURE: CPT

## 2020-05-22 PROCEDURE — 85025 COMPLETE CBC W/AUTO DIFF WBC: CPT

## 2020-05-22 NOTE — ED NOTES
I have reviewed discharge instructions with the patient. The patient verbalized understanding. VSS  Ambulated out of the department with a steady gait in no acute distress.

## 2020-05-22 NOTE — ED TRIAGE NOTES
Patient arrives ambulatory from home with CC of elevated blood pressure. Pt reports she woke up feeling nauseous and decided to take her BP and got a reading of 182/90. Pt taking lisinopril as prescribed. Called her PCP This week about her elevated pressures and was told to continue to monitor. Denies chest pain, shortness of breath, headache, cough congestion, fevers.

## 2020-05-22 NOTE — DISCHARGE INSTRUCTIONS
Patient Education      Work up in the emergency room was reassuring- blood work was normal.  Your blood pressure is coming down. Please call Dr. [place] this morning to let him know about your visit and blood pressure readings. He will advise you as far as how to adjust your current medications. Return to the ED if your blood pressure goes over 200/100 and/or you develop signs of hypertensive urgency emergency- such as chest pain/shortness of breath, focal weakness/numbness, change in speech or vision, severe headache/vomiting, etc.    Learning About High Blood Pressure  What is high blood pressure? Blood pressure is a measure of how hard the blood pushes against the walls of your arteries. It's normal for blood pressure to go up and down throughout the day. But if it stays up, you have high blood pressure. Another name for high blood pressure is hypertension. Two numbers tell you your blood pressure. The first number is the systolic pressure (top number). It shows how hard the blood pushes when your heart is pumping. The second number is the diastolic pressure (bottom number). It shows how hard the blood pushes between heartbeats, when your heart is relaxed and filling with blood. Your doctor will give you a goal for your blood pressure based on your health and your age. High blood pressure (hypertension) means that the top number stays high, or the bottom number stays high, or both. High blood pressure increases the risk of stroke, heart attack, and other problems. What happens when you have high blood pressure? · Blood flows through your arteries with too much force. Over time, this damages the walls of your arteries. But you can't feel it. High blood pressure usually doesn't cause symptoms. · Fat and calcium start to build up in your arteries. This buildup is called plaque. Plaque makes your arteries narrower and stiffer. Blood can't flow through them as easily.   · This lack of good blood flow starts to damage some of the organs in your body. This can lead to problems such as coronary artery disease and heart attack, heart failure, stroke, kidney failure, and eye damage. How can you prevent high blood pressure? · Stay at a healthy weight. · Try to limit how much sodium you eat to less than 2,300 milligrams (mg) a day. If you limit your sodium to 1,500 mg a day, you can lower your blood pressure even more. ? Buy foods that are labeled \"unsalted,\" \"sodium-free,\" or \"low-sodium. \" Foods labeled \"reduced-sodium\" and \"light sodium\" may still have too much sodium. ? Flavor your food with garlic, lemon juice, onion, vinegar, herbs, and spices instead of salt. Do not use soy sauce, steak sauce, onion salt, garlic salt, mustard, or ketchup on your food. ? Use less salt (or none) when recipes call for it. You can often use half the salt a recipe calls for without losing flavor. · Be physically active. Get at least 30 minutes of exercise on most days of the week. Walking is a good choice. You also may want to do other activities, such as running, swimming, cycling, or playing tennis or team sports. · Limit alcohol to 2 drinks a day for men and 1 drink a day for women. · Eat plenty of fruits, vegetables, and low-fat dairy products. Eat less saturated and total fats. How is high blood pressure treated? · Your doctor will suggest making lifestyle changes to help your heart. For example, your doctor may ask you to eat healthy foods, quit smoking, lose extra weight, and be more active. · If lifestyle changes don't help enough, your doctor may recommend that you take medicine. · When blood pressure is very high, medicines are needed to lower it. Follow-up care is a key part of your treatment and safety. Be sure to make and go to all appointments, and call your doctor if you are having problems. It's also a good idea to know your test results and keep a list of the medicines you take. Where can you learn more?   Go to http://sherman-laya.info/  Enter P501 in the search box to learn more about \"Learning About High Blood Pressure. \"  Current as of: December 15, 2019Content Version: 12.4  © 8531-4737 [employer]. Care instructions adapted under license by [employer] (which disclaims liability or warranty for this information). If you have questions about a medical condition or this instruction, always ask your healthcare professional. [place] [address] any warranty or liability for your use of this information.

## 2020-05-22 NOTE — ED PROVIDER NOTES
HPI     77-year-old female with a history of elevated cholesterol, hypertension, degenerative joint disease presents the emergency department concerned about elevated blood pressure. She states over the last 2 weeks she has noticed her blood pressure has been steadily climbing. She has spoken with her doctor who wants her to continue to monitor it. She has no symptoms of elevated blood pressure. Tonight she woke up and felt nauseous with a little bit of a headache so she checked her blood pressure and it was in the upper 180s over 90s. She took an extra lisinopril and drove here. Currently she feels totally fine. She has no headache or nausea. She has no vision change. She has no chest pain or shortness of breath. She has no abdominal pain nausea vomiting or diarrhea. She has had no fever cough or COVID-19 exposure. She has no leg swelling. She has no focal weakness or numbness.     Past Medical History:   Diagnosis Date    DJD (degenerative joint disease), cervical 9/6/2010    Elevated cholesterol 8/09    HTN (hypertension) 6/09       Past Surgical History:   Procedure Laterality Date    HX APPENDECTOMY  1985    HX REFRACTIVE SURGERY  2008         Family History:   Problem Relation Age of Onset    Hypertension Mother     Stroke Mother         age 40    Hypertension Maternal Grandmother     Stroke Maternal Grandmother        Social History     Socioeconomic History    Marital status:      Spouse name: Not on file    Number of children: Not on file    Years of education: Not on file    Highest education level: Not on file   Occupational History    Occupation:    Social Needs    Financial resource strain: Not on file    Food insecurity     Worry: Not on file     Inability: Not on file    Transportation needs     Medical: Not on file     Non-medical: Not on file   Tobacco Use    Smoking status: Never Smoker    Smokeless tobacco: Never Used   Substance and Sexual Activity  Alcohol use: Yes    Drug use: Never    Sexual activity: Yes     Partners: Male   Lifestyle    Physical activity     Days per week: Not on file     Minutes per session: Not on file    Stress: Not on file   Relationships    Social connections     Talks on phone: Not on file     Gets together: Not on file     Attends Ca[employer] service: Not on file     Active member of club or organization: Not on file     Attends meetings of clubs or organizations: Not on file     Relationship status: Not on file    Intimate partner violence     Fear of current or ex partner: Not on file     Emotionally abused: Not on file     Physically abused: Not on file     Forced sexual activity: Not on file   Other Topics Concern     Service Not Asked    Blood Transfusions Not Asked    Caffeine Concern Not Asked    Occupational Exposure Not Asked   Veto Risk Hazards Not Asked    Sleep Concern Not Asked    Stress Concern Not Asked    Weight Concern Not Asked    Special Diet Yes     Comment: healthy    Back Care Not Asked    Exercise Yes     Comment: aerobic    Bike Helmet Not Asked    Seat Belt Not Asked    Self-Exams Not Asked   Social History Narrative    Not on file         ALLERGIES: Aspirin    Review of Systems   Constitutional: Negative for fever. HENT: Negative for congestion. Eyes: Negative for visual disturbance. Respiratory: Negative for cough and shortness of breath. Cardiovascular: Negative for chest pain. Gastrointestinal: Positive for nausea. Negative for abdominal pain. Endocrine: Negative for polyuria. Genitourinary: Negative for dysuria. Musculoskeletal: Negative for back pain. Skin: Negative for rash. Neurological: Positive for headaches. Negative for speech difficulty, weakness and numbness. Psychiatric/Behavioral: Negative for dysphoric mood.        Vitals:    05/22/20 0428   BP: (!) 184/93   Pulse: 86   Resp: 16   Temp: 98.1 °F (36.7 °C)   SpO2: 98%            Physical Exam  Constitutional:       General: She is not in acute distress. Appearance: She is well-developed. HENT:      Head: Normocephalic and atraumatic. Mouth/Throat:      Pharynx: No oropharyngeal exudate. Eyes:      General: No scleral icterus. Right eye: No discharge. Left eye: No discharge. Pupils: Pupils are equal, round, and reactive to light. Neck:      Musculoskeletal: Normal range of motion and neck supple. Vascular: No JVD. Cardiovascular:      Rate and Rhythm: Normal rate and regular rhythm. Heart sounds: Normal heart sounds. No murmur. Pulmonary:      Effort: Pulmonary effort is normal. No respiratory distress. Breath sounds: Normal breath sounds. No stridor. No wheezing or rales. Chest:      Chest wall: No tenderness. Abdominal:      General: Bowel sounds are normal. There is no distension. Palpations: Abdomen is soft. There is no mass. Tenderness: There is no abdominal tenderness. There is no guarding or rebound. Musculoskeletal: Normal range of motion. Skin:     General: Skin is warm and dry. Capillary Refill: Capillary refill takes less than 2 seconds. Findings: No rash. Neurological:      Mental Status: She is oriented to person, place, and time. Psychiatric:         Behavior: Behavior normal.         Thought Content: Thought content normal.         Judgment: Judgment normal.          MDM       Procedures    Labs ok. Pressure coming down nicely. Patient reassured. Encouarged to follow up with her PCP. OK to take additional lisinopril if pressure goes back up.  Return precautions provided

## 2020-05-26 ENCOUNTER — VIRTUAL VISIT (OUTPATIENT)
Dept: FAMILY MEDICINE CLINIC | Age: 60
End: 2020-05-26

## 2020-05-26 DIAGNOSIS — N95.1 MENOPAUSAL SYNDROME (HOT FLASHES): ICD-10-CM

## 2020-05-26 DIAGNOSIS — E55.9 VITAMIN D DEFICIENCY: ICD-10-CM

## 2020-05-26 DIAGNOSIS — E78.5 DYSLIPIDEMIA, GOAL LDL BELOW 130: ICD-10-CM

## 2020-05-26 DIAGNOSIS — M47.812 SPONDYLOSIS OF CERVICAL REGION WITHOUT MYELOPATHY OR RADICULOPATHY: ICD-10-CM

## 2020-05-26 DIAGNOSIS — I10 ESSENTIAL HYPERTENSION: Primary | ICD-10-CM

## 2020-05-26 NOTE — PROGRESS NOTES
Chief Complaint   Patient presents with    Hypertension     bp 127/79 this am seen in ER for bp 196/94    1. Have you been to the ER, urgent care clinic since your last visit? Hospitalized since your last visit? Yes Reason for visit: ER for bp high    2. Have you seen or consulted any other health care providers outside of the [address] [place] [address] since your last visit? Include any pap smears or colon screening.  No

## 2020-05-26 NOTE — PROGRESS NOTES
HISTORY OF PRESENT ILLNESS  HPI  Memo Tejeda is a 60 y.o. female with a history of HTN, cervical spondylosis, menopausal syndrome and hyperlipidemia with LDL goal <130. Pt is seen today through virtual visit. Pt was seen at the [facility] ED on 5/22 for elevated BP concerns. She notes feeling nauseous that morning at which point she took her BP and it was 182/90. Pt takes lisinopril for her BP and was advised to take an extra dose if her pressure goes back up again. Pt was discharged the same day. Pt says she normally takes 20 mg of lisinopril, but took two 20 mg tablets last Friday. She adds checking her BP about once a week. Pt notes she had a reading of 142/80 last week, so she started taking it the last few days with similar readings. She reports having systolic readings of 253 then 194 late Thursday night. She says this morning her BP was 127/79. Pt complains of a pressure in her neck and back of head area that wakes her up from sleep. She endorses having nausea with this as well. She notes sometimes doing ROM exercises for her neck when she can. Pt says her weight has been up recently. She mentions having a fall down the stairs recently so she has not been able to walk as much recently. She mentions taking Advil for associated pains from this fall. She states having headaches about once a week. She notes seeing a chiropractor for this in the past with relief, but has not done so recently. Pt denies unusual SOB, chest pain, and any ER visits or hospitalizations since 5/22. Consent: Memo Tejeda, who was seen by synchronous (real-time) audio-video technology, and/or her healthcare decision maker, is aware that this patient-initiated, Telehealth encounter on 5/26/2020 is a billable service, with coverage as determined by her insurance carrier. She is aware that she may receive a bill and has provided verbal consent to proceed: Yes.        Memo Tejeda is a 60 y.o. female who was evaluated by an audio-video encounter for concerns as above. Patient identification was verified prior to start of the visit. A caregiver was present when appropriate. Due to this being a TeleHealth encounter (During IFSIW-52 public health emergency), evaluation of the following organ systems was limited: Vitals/Constitutional/EENT/Resp/CV/GI//MS/Neuro/Skin/Heme-Lymph-Imm. Pursuant to the emergency declaration under the [facility] [place] [address] waiver authority and the [employer] Act, this Virtual Visit was conducted, with patient's (and/or legal guardian's) consent, to reduce the patient's risk of exposure to COVID-19 and provide necessary medical care. Services were provided through a synchronous discussion virtually to substitute for in-person clinic visit. I was in the office. The patient was at home. Past Medical History:   Diagnosis Date    DJD (degenerative joint disease), cervical 9/6/2010    Elevated cholesterol 8/09    HTN (hypertension) 6/09     Past Surgical History:   Procedure Laterality Date    HX APPENDECTOMY  1985    HX REFRACTIVE SURGERY  2008     Current Outpatient Medications on File Prior to Visit   Medication Sig Dispense Refill    lisinopril (PRINIVIL, ZESTRIL) 10 mg tablet TAKE 1 TABLET BY MOUTH TWICE DAILY 60 Tab 5    [DISCONTINUED] estradiol-norethindrone (ACTIVELLA) 1-0.5 mg per tablet Take 1 tablet by mouth daily. No current facility-administered medications on file prior to visit.       Allergies   Allergen Reactions    Aspirin Nausea and Vomiting     Family History   Problem Relation Age of Onset    Hypertension Mother     Stroke Mother         age 40    Hypertension Maternal Grandmother     Stroke Maternal Grandmother      Social History     Socioeconomic History    Marital status:      Spouse name: Not on file    Number of children: Not on file    Years of education: Not on file  Highest education level: Not on file   Occupational History    Occupation: [employer]   Tobacco Use    Smoking status: Never Smoker    Smokeless tobacco: Never Used   Substance and Sexual Activity    Alcohol use: Yes    Drug use: Never    Sexual activity: Yes     Partners: Male   Other Topics Concern    Special Diet Yes     Comment: healthy    Exercise Yes     Comment: aerobic                Review of Systems   Constitutional: Negative for chills, diaphoresis, fever, malaise/fatigue and weight loss. Eyes: Negative for blurred vision, double vision, pain and redness. Respiratory: Negative for cough, shortness of breath and wheezing. Cardiovascular: Negative for chest pain, palpitations, orthopnea, claudication, leg swelling and PND. Skin: Negative for itching and rash. Neurological: Negative for dizziness, tingling, tremors, sensory change, speech change, focal weakness, seizures, loss of consciousness, weakness and headaches. Results for orders placed or performed during the hospital encounter of 05/22/20   URINE CULTURE HOLD SAMPLE   Result Value Ref Range    Urine culture hold        Urine on hold in Microbiology dept for 2 days. If unpreserved urine is submitted, it cannot be used for addtional testing after 24 hours, recollection will be required. CBC WITH AUTOMATED DIFF   Result Value Ref Range    WBC 7.7 3.6 - 11.0 K/uL    RBC 4.20 3.80 - 5.20 M/uL    HGB 12.6 11.5 - 16.0 g/dL    HCT 39.5 35.0 - 47.0 %    MCV 94.0 80.0 - 99.0 FL    MCH 30.0 26.0 - 34.0 PG    MCHC 31.9 30.0 - 36.5 g/dL    RDW 13.4 11.5 - 14.5 %    PLATELET 943 223 - 926 K/uL    MPV 11.7 8.9 - 12.9 FL    NRBC 0.0 0  WBC    ABSOLUTE NRBC 0.00 0.00 - 0.01 K/uL    NEUTROPHILS 65 32 - 75 %    LYMPHOCYTES 23 12 - 49 %    MONOCYTES 9 5 - 13 %    EOSINOPHILS 2 0 - 7 %    BASOPHILS 1 0 - 1 %    IMMATURE GRANULOCYTES 0 0.0 - 0.5 %    ABS. NEUTROPHILS 5.0 1.8 - 8.0 K/UL    ABS. LYMPHOCYTES 1.8 0.8 - 3.5 K/UL    ABS. MONOCYTES 0.7 0.0 - 1.0 K/UL    ABS. EOSINOPHILS 0.1 0.0 - 0.4 K/UL    ABS. BASOPHILS 0.1 0.0 - 0.1 K/UL    ABS. IMM. GRANS. 0.0 0.00 - 0.04 K/UL    DF AUTOMATED     METABOLIC PANEL, COMPREHENSIVE   Result Value Ref Range    Sodium 141 136 - 145 mmol/L    Potassium 3.8 3.5 - 5.1 mmol/L    Chloride 113 (H) 97 - 108 mmol/L    CO2 24 21 - 32 mmol/L    Anion gap 4 (L) 5 - 15 mmol/L    Glucose 112 (H) 65 - 100 mg/dL    BUN 17 6 - 20 MG/DL    Creatinine 0.80 0.55 - 1.02 MG/DL    BUN/Creatinine ratio 21 (H) 12 - 20      GFR est AA >60 >60 ml/min/1.73m2    GFR est non-AA >60 >60 ml/min/1.73m2    Calcium 9.2 8.5 - 10.1 MG/DL    Bilirubin, total 0.2 0.2 - 1.0 MG/DL    ALT (SGPT) 24 12 - 78 U/L    AST (SGOT) 10 (L) 15 - 37 U/L    Alk.  phosphatase 60 45 - 117 U/L    Protein, total 7.2 6.4 - 8.2 g/dL    Albumin 3.8 3.5 - 5.0 g/dL    Globulin 3.4 2.0 - 4.0 g/dL    A-G Ratio 1.1 1.1 - 2.2     URINALYSIS W/MICROSCOPIC   Result Value Ref Range    Color YELLOW/STRAW      Appearance CLEAR CLEAR      Specific gravity 1.005 1.003 - 1.030      pH (UA) 6.0 5.0 - 8.0      Protein Negative NEG mg/dL    Glucose Negative NEG mg/dL    Ketone Negative NEG mg/dL    Bilirubin Negative NEG      Blood Negative NEG      Urobilinogen 0.2 0.2 - 1.0 EU/dL    Nitrites Negative NEG      Leukocyte Esterase Negative NEG      WBC 0-4 0 - 4 /hpf    RBC 0-5 0 - 5 /hpf    Epithelial cells FEW FEW /lpf    Bacteria Negative NEG /hpf    Hyaline cast 0-2 0 - 5 /lpf         Physical Exam  Visit Vitals  LMP 11/15/2010       General: alert, cooperative, no distress   Mental  status: normal mood, behavior, speech, dress, motor activity, and thought processes, able to follow commands   HENT: NCAT   Neck: no visualized mass   Resp: no respiratory distress   Neuro: no gross deficits   Skin: no discoloration or lesions of concern on visible areas   Psychiatric: normal affect, consistent with stated mood, no evidence of hallucinations       ASSESSMENT and PLAN    ICD-10-CM ICD-9-CM    1. Essential hypertension I10 401.9    2. Vitamin D deficiency E55.9 268.9    3. Dyslipidemia, goal LDL below 130 E78.5 272.4    4. Spondylosis of cervical region without myelopathy or radiculopathy M47.812 721.0    5. Menopausal syndrome (hot flashes) N95.1 627.2      Diagnoses and all orders for this visit:    1. Essential hypertension    2. Vitamin D deficiency    3. Dyslipidemia, goal LDL below 130    4. Spondylosis of cervical region without myelopathy or radiculopathy    5. Menopausal syndrome (hot flashes)      Follow-up and Dispositions    · Return in about 6 months (around 11/26/2020) for F/U hypertension, f/u Vitamin D deficiency, F/U weight concerns, osteoarthritis. lab results and schedule of future lab studies reviewed with patient  reviewed diet, exercise and weight control  cardiovascular risk and specific lipid/LDL goals reviewed  reviewed medications and side effects in detail  Please call my office if there are any questions- [phone]. I will arrange for follow up after the lab tests done from today return  Recommended a weekly \"heart check. \" I went into detail how to do this. Call for refills on medications as needed. Discussed expected course/resolution/complications of diagnosis in detail with patient. Medication risks/benefits/costs/interactions/alternatives discussed with patient. Pt was given an after visit summary which includes diagnoses, current medications & vitals. Pt expressed understanding with the diagnosis and plan    BMI is significantly elevated- in the overweight range. I reviewed diet, exercise and weight control.  Discussed weight control in detail, the importance of mainly decreased carbs, and for weight maintenance, exercise; discussed different diets and that it isn't as important to watch the type of foods as it is to decrease calorie intake no matter what type of diet you do, etc.     Total 25 minutes,60 % counseling re: Review of  the proper technique of checking the blood pressure- check it on an average day only, not on a stressful day, sitting, no exercise for at least 1 hour and not experiencing any new pain( chronic pain is OK). Patient encouraged to check BP sitting and standing at least once a month and to report these readings to me if > 140/ 90 on average , or if the standing BP is >  15 points lower than the sitting. Always check it twice and if there is > 5 points decrease from the previous reading( top reading or systolic) keep checking it until it does not drop 5 points. Write only this final reading down, not the preceding readings. If out of these readings there is only 1 out of 4 or less > 540, or > 90 diastolic then your blood pressure is OK; it needs further treatment if it is above this. Also, don't forget,  as noted above, to check your blood pressure standing once a month; this is to detect a drop in your BP that might lead to fainting and serious injury; you check it standing with your arm hanging straight down and relaxed. Check it twice waiting 1 minute between the two readings. If, with either one of these 2 readings there is a > 15 point drop of the systolic compared to your sitting pressure( done before the standing BP), then let me know. Following these guidelines, continue to check your BP and write down only the ones described above and it will help me to effectively treat your blood pressure. Regular exercise is very important to your health; it helps mentally, physically, socially; it prevents injuries if done properly. Exercise, even as simple as walking 20-30 minutes daily has major benefits to your health even though your \"numbers\" are the same in the lab. See if you can add this into your daily regimen and after a few months it will become a regular habit-\"just something you do,\" like brushing your teeth.      A combination of aerobic exercise and strengthening and stretching is felt to be the best for you, so this should be your ultimate goal.   This can be done in the privacy of your home or in a group setting as at the gym  Some prefer having a , others prefer to do exercise in groups or individually. Do what \"works\" for you. You need to make it simple and \"fun,\" or you most likely will not continue it. Recommended a weekly \"heart check. \" I went into detail how to do this. Reviewed symptoms, or lack thereof, of hypertension and elevated cholesterol. Also, discussed symptoms of concern that were noted today in the note above, treatment options( including doing nothing), when to follow up before recommended time frame. Also, answered all questions. Her BP went up for a short period of time and has come back down even though she is on 20 mg of lisinopril daily. The 40 mg dose that she took for a few days did not cause her any problems with hypotension, but because her symptoms did not come back and her pressure was lower, she decided to go back to 20 mg. Talked in detail about not changing her BP medications without calling us and that there is never an emergency to treat BP after few days or even a week or two. Re-stressed the importance of not checking her BP when she is having new symptoms, and to just check it on a weekly basis as she usually does. If he has any new symptoms she should call and we will help her with those. I'm not sure what was causing her posterior neck pressure or associated nausea, but she does have a history of neck DDD and DJD, that has responded to chiropractic manipulation. She will plan on starting that back in the future when that is available, as they are not seeing patients presently due to the virus. When she does take Advil or any type of NSAID, she should take it with Pepcid or OTC PPI to protect her stomach and always try Tylenol first. Recommended she do some daily ROM exercises for her neck and reviewed those with her today.  She should call back if she has return of her neck pressure and nausea, or questions about her BP. Her last lab work showed elevated cholesterol and she is on the borderline with 7.5% chance of cardiovascular disease from the formula. She is next due for lab work in August and we will recheck the cholesterol then. This document was written by Eligio Hassan, as dictated by Lakisha Parker MD.  I have reviewed and agree with the above note and have made corrections where appropriate Walter Chamorro M.D.

## 2020-08-31 DIAGNOSIS — I10 ESSENTIAL HYPERTENSION: ICD-10-CM

## 2020-08-31 RX ORDER — LISINOPRIL 10 MG/1
TABLET ORAL
Qty: 60 TAB | Refills: 0 | Status: SHIPPED | OUTPATIENT
Start: 2020-08-31 | End: 2020-10-09

## 2020-10-09 DIAGNOSIS — I10 ESSENTIAL HYPERTENSION: ICD-10-CM

## 2020-10-09 RX ORDER — LISINOPRIL 10 MG/1
TABLET ORAL
Qty: 60 TAB | Refills: 2 | Status: SHIPPED | OUTPATIENT
Start: 2020-10-09 | End: 2021-02-05

## 2020-12-03 NOTE — PROGRESS NOTES
GREAT NEWS!! Basically unchanged cholesterol #s- no statin recommendations until about age of 76 according to cardiac risk assessment formula. No diabetes, normal liver and kidney tests. Normal CBC( red and white blood cells and platelets) and urinalysis. Vit D Def- OTC Vitamin D 5000 units daily until recheck in 6 months.

## 2021-02-05 DIAGNOSIS — I10 ESSENTIAL HYPERTENSION: ICD-10-CM

## 2021-02-05 RX ORDER — LISINOPRIL 10 MG/1
TABLET ORAL
Qty: 60 TAB | Refills: 4 | Status: SHIPPED | OUTPATIENT
Start: 2021-02-05 | End: 2021-08-06

## 2021-06-01 ENCOUNTER — OFFICE VISIT (OUTPATIENT)
Dept: FAMILY MEDICINE CLINIC | Age: 61
End: 2021-06-01
Payer: COMMERCIAL

## 2021-06-01 VITALS
HEIGHT: 64 IN | WEIGHT: 165 LBS | RESPIRATION RATE: 16 BRPM | HEART RATE: 71 BPM | OXYGEN SATURATION: 98 % | BODY MASS INDEX: 28.17 KG/M2 | DIASTOLIC BLOOD PRESSURE: 74 MMHG | TEMPERATURE: 97.8 F | SYSTOLIC BLOOD PRESSURE: 126 MMHG

## 2021-06-01 DIAGNOSIS — E55.9 VITAMIN D DEFICIENCY: ICD-10-CM

## 2021-06-01 DIAGNOSIS — Z00.00 PHYSICAL EXAM: ICD-10-CM

## 2021-06-01 DIAGNOSIS — E78.5 DYSLIPIDEMIA, GOAL LDL BELOW 130: ICD-10-CM

## 2021-06-01 DIAGNOSIS — I10 ESSENTIAL HYPERTENSION: Primary | ICD-10-CM

## 2021-06-01 DIAGNOSIS — M47.812 SPONDYLOSIS OF CERVICAL REGION WITHOUT MYELOPATHY OR RADICULOPATHY: ICD-10-CM

## 2021-06-01 DIAGNOSIS — R63.5 ABNORMAL WEIGHT GAIN: ICD-10-CM

## 2021-06-01 LAB
25(OH)D3 SERPL-MCNC: 15.9 NG/ML (ref 30–100)
ALBUMIN SERPL-MCNC: 4.1 G/DL (ref 3.5–5)
ALBUMIN/GLOB SERPL: 1.2 {RATIO} (ref 1.1–2.2)
ALP SERPL-CCNC: 101 U/L (ref 45–117)
ALT SERPL-CCNC: 34 U/L (ref 12–78)
ANION GAP SERPL CALC-SCNC: 5 MMOL/L (ref 5–15)
APPEARANCE UR: CLEAR
AST SERPL-CCNC: 17 U/L (ref 15–37)
BACTERIA URNS QL MICRO: NEGATIVE /HPF
BILIRUB SERPL-MCNC: 0.3 MG/DL (ref 0.2–1)
BILIRUB UR QL: NEGATIVE
BUN SERPL-MCNC: 18 MG/DL (ref 6–20)
BUN/CREAT SERPL: 26 (ref 12–20)
CALCIUM SERPL-MCNC: 9.4 MG/DL (ref 8.5–10.1)
CHLORIDE SERPL-SCNC: 110 MMOL/L (ref 97–108)
CHOLEST SERPL-MCNC: 294 MG/DL
CO2 SERPL-SCNC: 27 MMOL/L (ref 21–32)
COLOR UR: ABNORMAL
CREAT SERPL-MCNC: 0.7 MG/DL (ref 0.55–1.02)
EPITH CASTS URNS QL MICRO: ABNORMAL /LPF
ERYTHROCYTE [DISTWIDTH] IN BLOOD BY AUTOMATED COUNT: 13.3 % (ref 11.5–14.5)
GLOBULIN SER CALC-MCNC: 3.5 G/DL (ref 2–4)
GLUCOSE SERPL-MCNC: 110 MG/DL (ref 65–100)
GLUCOSE UR STRIP.AUTO-MCNC: NEGATIVE MG/DL
HCT VFR BLD AUTO: 43.2 % (ref 35–47)
HDLC SERPL-MCNC: 77 MG/DL
HDLC SERPL: 3.8 {RATIO} (ref 0–5)
HGB BLD-MCNC: 13.6 G/DL (ref 11.5–16)
HGB UR QL STRIP: NEGATIVE
HYALINE CASTS URNS QL MICRO: ABNORMAL /LPF (ref 0–5)
KETONES UR QL STRIP.AUTO: NEGATIVE MG/DL
LDLC SERPL CALC-MCNC: 173.2 MG/DL (ref 0–100)
LEUKOCYTE ESTERASE UR QL STRIP.AUTO: ABNORMAL
MCH RBC QN AUTO: 30.2 PG (ref 26–34)
MCHC RBC AUTO-ENTMCNC: 31.5 G/DL (ref 30–36.5)
MCV RBC AUTO: 95.8 FL (ref 80–99)
NITRITE UR QL STRIP.AUTO: NEGATIVE
NRBC # BLD: 0 K/UL (ref 0–0.01)
NRBC BLD-RTO: 0 PER 100 WBC
PH UR STRIP: 5 [PH] (ref 5–8)
PLATELET # BLD AUTO: 236 K/UL (ref 150–400)
PMV BLD AUTO: 12.9 FL (ref 8.9–12.9)
POTASSIUM SERPL-SCNC: 4.9 MMOL/L (ref 3.5–5.1)
PROT SERPL-MCNC: 7.6 G/DL (ref 6.4–8.2)
PROT UR STRIP-MCNC: NEGATIVE MG/DL
RBC # BLD AUTO: 4.51 M/UL (ref 3.8–5.2)
RBC #/AREA URNS HPF: ABNORMAL /HPF (ref 0–5)
SODIUM SERPL-SCNC: 142 MMOL/L (ref 136–145)
SP GR UR REFRACTOMETRY: 1.02 (ref 1–1.03)
TRIGL SERPL-MCNC: 219 MG/DL (ref ?–150)
TSH SERPL DL<=0.05 MIU/L-ACNC: 1.64 UIU/ML (ref 0.36–3.74)
UROBILINOGEN UR QL STRIP.AUTO: 0.2 EU/DL (ref 0.2–1)
VLDLC SERPL CALC-MCNC: 43.8 MG/DL
WBC # BLD AUTO: 7.5 K/UL (ref 3.6–11)
WBC URNS QL MICRO: ABNORMAL /HPF (ref 0–4)

## 2021-06-01 PROCEDURE — 99396 PREV VISIT EST AGE 40-64: CPT | Performed by: FAMILY MEDICINE

## 2021-06-01 NOTE — PROGRESS NOTES
Chief Complaint   Patient presents with    Complete Physical   1. Have you been to the ER, urgent care clinic since your last visit? Hospitalized since your last visit? Yes ER for BP    2. Have you seen or consulted any other health care providers outside of the [address] [place] [address] since your last visit? Include any pap smears or colon screening.  No

## 2021-06-01 NOTE — PROGRESS NOTES
HISTORY OF PRESENT ILLNESS  HPI  Delisa Quinones a 63 y.o. female with a history of HTN, cervical spondylosis, menopausal syndrome and hyperlipidemia with LDL goal <130, who presents to the office today for a complete physical and for f/u of these health problems. Overall, pt is doing well. She has done more regular activity with walking. She has no FHx of heart problems. She has been taking 1000 units of vitamin D for that past year. Pt received both doses of the Pfizer COVID-19 vaccine. Pt denies unusual SOB, chest pain, and any recent ER visits or hospitalizations. Past Medical History:   Diagnosis Date    DJD (degenerative joint disease), cervical 9/6/2010    Elevated cholesterol 8/09    HTN (hypertension) 6/09    Vitamin D deficiency      Past Surgical History:   Procedure Laterality Date    HX APPENDECTOMY  1985    HX REFRACTIVE SURGERY  2008     Current Outpatient Medications on File Prior to Visit   Medication Sig Dispense Refill    lisinopriL (PRINIVIL, ZESTRIL) 10 mg tablet TAKE 1 TABLET BY MOUTH TWICE DAILY 60 Tab 4     No current facility-administered medications on file prior to visit. Allergies   Allergen Reactions    Aspirin Nausea and Vomiting     Family History   Problem Relation Age of Onset    Hypertension Mother     Stroke Mother         age 40    Hypertension Maternal Grandmother     Stroke Maternal Grandmother      Social History     Socioeconomic History    Marital status:      Spouse name: Not on file    Number of children: Not on file    Years of education: Not on file    Highest education level: Not on file   Occupational History    Occupation:    Tobacco Use    Smoking status: Never Smoker    Smokeless tobacco: Never Used   Vaping Use    Vaping Use: Never used   Substance and Sexual Activity    Alcohol use:  Yes    Drug use: Never    Sexual activity: Yes     Partners: Male   Other Topics Concern    Special Diet Yes Comment: healthy    Exercise Yes     Comment: aerobic     Social Determinants of Health     Financial Resource Strain:     Difficulty of Paying Living Expenses:    Food Insecurity:     Worried About Running Out of Food in the Last Year:     [address] in the Last Year:    Transportation Needs:     Lack of Transportation (Medical):  Lack of Transportation (Non-Medical):    Physical Activity:     Days of Exercise per Week:     Minutes of Exercise per Session:    Stress:     Feeling of Stress :    Social Connections:     Frequency of Communication with Friends and Family:     Frequency of Social Gatherings with Friends and Family:     Attends [employer] Services:     Active Member of Clubs or Organizations:     Attends Club or Organization Meetings:     Marital Status:                Review of Systems   Constitutional: Negative for chills, diaphoresis, fever, malaise/fatigue and weight loss. HENT: Negative for congestion, ear discharge, ear pain, hearing loss, nosebleeds, sore throat and tinnitus. Eyes: Negative for blurred vision, double vision, photophobia, pain, discharge and redness. Respiratory: Negative for cough, hemoptysis, sputum production, shortness of breath, wheezing and stridor. Cardiovascular: Negative for chest pain, palpitations, orthopnea, claudication, leg swelling and PND. Gastrointestinal: Negative for abdominal pain, blood in stool, constipation, diarrhea, heartburn, melena, nausea and vomiting. Genitourinary: Negative for dysuria, flank pain, frequency, hematuria and urgency. Musculoskeletal: Negative for back pain, falls, joint pain, myalgias and neck pain. Skin: Negative for itching and rash. Neurological: Negative for dizziness, tingling, tremors, sensory change, speech change, focal weakness, seizures, loss of consciousness, weakness and headaches. Endo/Heme/Allergies: Negative for environmental allergies and polydipsia.  Does not bruise/bleed easily. Psychiatric/Behavioral: Negative for depression, hallucinations, memory loss, substance abuse and suicidal ideas. The patient is not nervous/anxious and does not have insomnia.       Results for orders placed or performed in visit on 06/01/21   LIPID PANEL   Result Value Ref Range    Cholesterol, total 294 (H) <200 MG/DL    Triglyceride 219 (H) <150 MG/DL    HDL Cholesterol 77 MG/DL    LDL, calculated 173.2 (H) 0 - 100 MG/DL    VLDL, calculated 43.8 MG/DL    CHOL/HDL Ratio 3.8 0.0 - 5.0     TSH 3RD GENERATION   Result Value Ref Range    TSH 1.64 0.36 - 3.74 uIU/mL   VITAMIN D, 25 HYDROXY   Result Value Ref Range    Vitamin D 25-Hydroxy 15.9 (L) 30 - 100 ng/mL   URINALYSIS W/MICROSCOPIC   Result Value Ref Range    Color YELLOW/STRAW      Appearance CLEAR CLEAR      Specific gravity 1.020 1.003 - 1.030      pH (UA) 5.0 5.0 - 8.0      Protein Negative Negative mg/dL    Glucose Negative Negative mg/dL    Ketone Negative Negative mg/dL    Bilirubin Negative Negative      Blood Negative Negative      Urobilinogen 0.2 0.2 - 1.0 EU/dL    Nitrites Negative Negative      Leukocyte Esterase TRACE (A) Negative      WBC 0-4 0 - 4 /hpf    RBC 0-5 0 - 5 /hpf    Epithelial cells FEW FEW /lpf    Bacteria Negative Negative /hpf    Hyaline cast 0-2 0 - 5 /lpf   CBC W/O DIFF   Result Value Ref Range    WBC 7.5 3.6 - 11.0 K/uL    RBC 4.51 3.80 - 5.20 M/uL    HGB 13.6 11.5 - 16.0 g/dL    HCT 43.2 35.0 - 47.0 %    MCV 95.8 80.0 - 99.0 FL    MCH 30.2 26.0 - 34.0 PG    MCHC 31.5 30.0 - 36.5 g/dL    RDW 13.3 11.5 - 14.5 %    PLATELET 818 235 - 182 K/uL    MPV 12.9 8.9 - 12.9 FL    NRBC 0.0 0  WBC    ABSOLUTE NRBC 0.00 0.00 - 5.14 K/uL   METABOLIC PANEL, COMPREHENSIVE   Result Value Ref Range    Sodium 142 136 - 145 mmol/L    Potassium 4.9 3.5 - 5.1 mmol/L    Chloride 110 (H) 97 - 108 mmol/L    CO2 27 21 - 32 mmol/L    Anion gap 5 5 - 15 mmol/L    Glucose 110 (H) 65 - 100 mg/dL    BUN 18 6 - 20 MG/DL    Creatinine 0.70 0.55 - 1.02 MG/DL    BUN/Creatinine ratio 26 (H) 12 - 20      GFR est AA >60 >60 ml/min/1.73m2    GFR est non-AA >60 >60 ml/min/1.73m2    Calcium 9.4 8.5 - 10.1 MG/DL    Bilirubin, total 0.3 0.2 - 1.0 MG/DL    ALT (SGPT) 34 12 - 78 U/L    AST (SGOT) 17 15 - 37 U/L    Alk. phosphatase 101 45 - 117 U/L    Protein, total 7.6 6.4 - 8.2 g/dL    Albumin 4.1 3.5 - 5.0 g/dL    Globulin 3.5 2.0 - 4.0 g/dL    A-G Ratio 1.2 1.1 - 2.2             Physical Exam  Visit Vitals  /74   Pulse 71   Temp 97.8 °F (36.6 °C)   Resp 16   Ht 5' 4\" (1.626 m)   Wt 165 lb (74.8 kg)   LMP 11/15/2010   SpO2 98%   BMI 28.32 kg/m²       General:  Alert, cooperative, no distress, appears stated age. Head:  Normocephalic, without obvious abnormality, atraumatic. Eyes:  Conjunctivae/corneas clear. PERRL, EOMs intact. Fundi benign. Ears:  Normal TMs and external ear canals both ears. Nose: Nares normal. Septum midline. Mucosa normal. No drainage or sinus tenderness. Throat: Lips, mucosa, and tongue normal. Teeth and gums normal.   Neck: Supple, symmetrical, trachea midline, no adenopathy, thyroid: no enlargement/tenderness/nodules, no carotid bruit and no JVD. Back:   Symmetric, no curvature. ROM normal. No CVA tenderness. Lungs:   Clear to auscultation bilaterally. Chest wall:  No tenderness or deformity. Heart:  Regular rate and rhythm, S1, S2 normal, no murmur, click, rub or gallop. Abdomen:   Soft, non-tender. Bowel sounds normal. No masses,  No organomegaly. Extremities: Extremities normal, atraumatic, no cyanosis or edema. Pulses: 2+ and symmetric all extremities. Skin: Skin color, texture, turgor normal. No rashes or lesions. Lymph nodes: Cervical, supraclavicular, and axillary nodes normal.   Neurologic: CNII-XII intact. Normal strength, sensation and reflexes throughout. ASSESSMENT and PLAN    ICD-10-CM ICD-9-CM    1.  Essential hypertension  T19 093.0 METABOLIC PANEL, COMPREHENSIVE      CBC W/O DIFF CBC W/O DIFF      METABOLIC PANEL, COMPREHENSIVE   2. Dyslipidemia, goal LDL below 130  E78.5 272.4 LIPID PANEL      METABOLIC PANEL, COMPREHENSIVE      LIPID PANEL      METABOLIC PANEL, COMPREHENSIVE   3. Physical exam  Z00.00 V70.9 LIPID PANEL      METABOLIC PANEL, COMPREHENSIVE      CBC W/O DIFF      URINALYSIS W/MICROSCOPIC      VITAMIN D, 25 HYDROXY      TSH 3RD GENERATION      LIPID PANEL      TSH 3RD GENERATION      VITAMIN D, 25 HYDROXY      URINALYSIS W/MICROSCOPIC      CBC W/O DIFF      METABOLIC PANEL, COMPREHENSIVE   4. Vitamin D deficiency  E55.9 268.9 VITAMIN D, 25 HYDROXY      VITAMIN D, 25 HYDROXY   5. Abnormal weight gain  R63.5 783.1 TSH 3RD GENERATION      TSH 3RD GENERATION   6. Spondylosis of cervical region without myelopathy or radiculopathy  M47.812 721.0      Diagnoses and all orders for this visit:    1. Essential hypertension  -     METABOLIC PANEL, COMPREHENSIVE; Future  -     CBC W/O DIFF; Future    2. Dyslipidemia, goal LDL below 130  -     LIPID PANEL; Future  -     METABOLIC PANEL, COMPREHENSIVE; Future    3. Physical exam  -     LIPID PANEL; Future  -     METABOLIC PANEL, COMPREHENSIVE; Future  -     CBC W/O DIFF; Future  -     URINALYSIS W/MICROSCOPIC; Future  -     VITAMIN D, 25 HYDROXY; Future  -     TSH 3RD GENERATION; Future    4. Vitamin D deficiency  -     VITAMIN D, 25 HYDROXY; Future    5. Abnormal weight gain  -     TSH 3RD GENERATION; Future    6. Spondylosis of cervical region without myelopathy or radiculopathy            lab results and schedule of future lab studies reviewed with patient  reviewed diet, exercise and weight control  cardiovascular risk and specific lipid/LDL goals reviewed  reviewed medications and side effects in detail  Please call my office if there are any questions- [phone]. I will arrange for follow up after the lab tests done from today return  Recommended a weekly \"heart check. \" I went into detail how to do this.   Call for refills on medications as needed. Discussed expected course/resolution/complications of diagnosis in detail with patient. Medication risks/benefits/costs/interactions/alternatives discussed with patient. Pt was given an after visit summary which includes diagnoses, current medications & vitals. Pt expressed understanding with the diagnosis and plan      BMI is significantly elevated- in the overweight range. I reviewed diet, exercise and weight control. Discussed weight control in detail, the importance of mainly decreased carbs, and for weight maintenance, exercise; discussed different diets and that it isn't as important to watch the type of foods as it is to decrease calorie intake no matter what type of diet you do, etc.     Total 30 minutes  re: Recommended a weekly \"heart check. \" I went into detail how to do this. Regular exercise is very important to your health; it helps mentally, physically, socially; it prevents injuries if done properly. Exercise, even as simple as walking 20-30 minutes daily has major benefits to your health even though your \"numbers\" are the same in the lab. See if you can add this into your daily regimen and after a few months it will become a regular habit-\"just something you do,\" like brushing your teeth. A combination of aerobic exercise and strengthening and stretching is felt to be the best for you, so this should be your ultimate goal.   This can be done in the privacy of your home or in a group setting as at the gym  Some prefer having a , others prefer to do exercise in groups or individually. Do what \"works\" for you. You need to make it simple and \"fun,\" or you most likely will not continue it. Reviewed symptoms, or lack thereof, of hypertension and elevated cholesterol.      Review of  the proper technique of checking the blood pressure- check it on an average day only, not on a stressful day, sitting, no exercise for at least 1 hour and not experiencing any new pain( chronic pain is OK). Patient encouraged to check BP sitting and standing at least once a month and to report these readings to me if > 140/ 90 on average , or if the standing BP is >  15 points lower than the sitting. Always check it twice and if there is > 5 points decrease from the previous reading( top reading or systolic) keep checking it until it does not drop 5 points. Write only this final reading down, not the preceding readings. If out of these readings there is only 1 out of 4 or less > 028, or > 90 diastolic then your blood pressure is OK; it needs further treatment if it is above this. Also, don't forget,  as noted above, to check your blood pressure standing once a month; this is to detect a drop in your BP that might lead to fainting and serious injury; you check it standing with your arm hanging straight down and relaxed. Check it twice waiting 1 minute between the two readings. If, with either one of these 2 readings there is a > 15 point drop of the systolic compared to your sitting pressure( done before the standing BP), then let me know. Following these guidelines, continue to check your BP and write down only the ones described above and it will help me to effectively treat your blood pressure. Also, discussed symptoms of concern that were noted today in the note above, treatment options( including doing nothing), when to follow up before recommended time frame. Also, answered all questions. We spent significant time talking about her weight. I encouraged her to look into mindful eating. Pt is fasting but did have a couple of glasses of wine yesterday (she normally drinks one day on the weekend, usually just one drink). This document was written by Aissatou Horne, as dictated by Mortimer Oram, MD.  I have reviewed and agree with the above note and have made corrections where appropriate Walter Kc M.D.

## 2021-08-06 DIAGNOSIS — I10 ESSENTIAL HYPERTENSION: ICD-10-CM

## 2021-08-08 RX ORDER — LISINOPRIL 10 MG/1
TABLET ORAL
Qty: 60 TABLET | Refills: 5 | Status: SHIPPED | OUTPATIENT
Start: 2021-08-08 | End: 2022-01-18

## 2021-10-05 NOTE — PROGRESS NOTES
No real change- elevated LDL, but no need for statin until~ 66 by cardiac risk calculation; VIT d is decreased- needs to take Vit D life long

## 2022-01-18 DIAGNOSIS — I10 ESSENTIAL HYPERTENSION: ICD-10-CM

## 2022-01-18 RX ORDER — LISINOPRIL 10 MG/1
TABLET ORAL
Qty: 60 TABLET | Refills: 5 | Status: SHIPPED | OUTPATIENT
Start: 2022-01-18 | End: 2022-07-10

## 2022-01-18 NOTE — TELEPHONE ENCOUNTER
Last visit 06/0121  Next appt 06/07/22  Patient states she only comes once a year not very 6 months and Dr Lisandro Parker agrees to this

## 2022-07-04 DIAGNOSIS — I10 ESSENTIAL HYPERTENSION: ICD-10-CM

## 2022-07-10 RX ORDER — LISINOPRIL 10 MG/1
TABLET ORAL
Qty: 60 TABLET | Refills: 2 | Status: SHIPPED | OUTPATIENT
Start: 2022-07-10 | End: 2022-10-05 | Stop reason: SDUPTHER

## 2022-10-05 ENCOUNTER — OFFICE VISIT (OUTPATIENT)
Dept: FAMILY MEDICINE CLINIC | Age: 62
End: 2022-10-05
Payer: COMMERCIAL

## 2022-10-05 VITALS
TEMPERATURE: 97.8 F | SYSTOLIC BLOOD PRESSURE: 130 MMHG | OXYGEN SATURATION: 98 % | HEIGHT: 64 IN | RESPIRATION RATE: 16 BRPM | DIASTOLIC BLOOD PRESSURE: 72 MMHG | BODY MASS INDEX: 28.17 KG/M2 | WEIGHT: 165 LBS | HEART RATE: 71 BPM

## 2022-10-05 DIAGNOSIS — E55.9 VITAMIN D DEFICIENCY: ICD-10-CM

## 2022-10-05 DIAGNOSIS — I10 ESSENTIAL HYPERTENSION: ICD-10-CM

## 2022-10-05 DIAGNOSIS — E78.5 DYSLIPIDEMIA, GOAL LDL BELOW 130: Primary | ICD-10-CM

## 2022-10-05 PROCEDURE — 99214 OFFICE O/P EST MOD 30 MIN: CPT | Performed by: FAMILY MEDICINE

## 2022-10-05 RX ORDER — LISINOPRIL 10 MG/1
10 TABLET ORAL 2 TIMES DAILY
Qty: 60 TABLET | Refills: 5 | Status: SHIPPED | OUTPATIENT
Start: 2022-10-05

## 2022-10-05 NOTE — PROGRESS NOTES
HISTORY OF PRESENT ILLNESS  HPI  Tamika Benson is a 60 y.o. female with a history of HTN, cervical spondylosis, menopausal syndrome and hyperlipidemia with LDL goal <130, who presents to the office today for f/u of these health problems. Pt ate 2 plums and seaweed salad with coffee a couple of hours ago. Pt checks her BP at home on occasion and it ranges 128-140/70-80. She has not noticed any orthostatic changes. Pt denies unusual SOB, chest pain, and any recent ER visits or hospitalizations. Past Medical History:   Diagnosis Date    DJD (degenerative joint disease), cervical 9/6/2010    Elevated cholesterol 8/09    HTN (hypertension) 6/09    Vitamin D deficiency      Past Surgical History:   Procedure Laterality Date    HX APPENDECTOMY  1985    HX REFRACTIVE SURGERY  2008     No current outpatient medications on file prior to visit. No current facility-administered medications on file prior to visit.      Allergies   Allergen Reactions    Aspirin Nausea and Vomiting     Family History   Problem Relation Age of Onset    Hypertension Mother     Stroke Mother         age 40    Hypertension Maternal Grandmother     Stroke Maternal Grandmother      Social History     Socioeconomic History    Marital status:    Occupational History    Occupation:    Tobacco Use    Smoking status: Never    Smokeless tobacco: Never   Vaping Use    Vaping Use: Never used   Substance and Sexual Activity    Alcohol use: Yes    Drug use: Never    Sexual activity: Yes     Partners: Male   Other Topics Concern    Special Diet Yes     Comment: healthy    Exercise Yes     Comment: aerobic     Social Determinants of Health     Financial Resource Strain: Low Risk     Difficulty of Paying Living Expenses: Not hard at all   Food Insecurity: No Food Insecurity    Worried About [address] in the Last Year: Never true    [address] in the Last Year: Never true                 ROS  Results for orders placed or performed in visit on 06/01/21   LIPID PANEL   Result Value Ref Range    Cholesterol, total 294 (H) <200 MG/DL    Triglyceride 219 (H) <150 MG/DL    HDL Cholesterol 77 MG/DL    LDL, calculated 173.2 (H) 0 - 100 MG/DL    VLDL, calculated 43.8 MG/DL    CHOL/HDL Ratio 3.8 0.0 - 5.0     TSH 3RD GENERATION   Result Value Ref Range    TSH 1.64 0.36 - 3.74 uIU/mL   VITAMIN D, 25 HYDROXY   Result Value Ref Range    Vitamin D 25-Hydroxy 15.9 (L) 30 - 100 ng/mL   URINALYSIS W/MICROSCOPIC   Result Value Ref Range    Color YELLOW/STRAW      Appearance CLEAR CLEAR      Specific gravity 1.020 1.003 - 1.030      pH (UA) 5.0 5.0 - 8.0      Protein Negative Negative mg/dL    Glucose Negative Negative mg/dL    Ketone Negative Negative mg/dL    Bilirubin Negative Negative      Blood Negative Negative      Urobilinogen 0.2 0.2 - 1.0 EU/dL    Nitrites Negative Negative      Leukocyte Esterase TRACE (A) Negative      WBC 0-4 0 - 4 /hpf    RBC 0-5 0 - 5 /hpf    Epithelial cells FEW FEW /lpf    Bacteria Negative Negative /hpf    Hyaline cast 0-2 0 - 5 /lpf   CBC W/O DIFF   Result Value Ref Range    WBC 7.5 3.6 - 11.0 K/uL    RBC 4.51 3.80 - 5.20 M/uL    HGB 13.6 11.5 - 16.0 g/dL    HCT 43.2 35.0 - 47.0 %    MCV 95.8 80.0 - 99.0 FL    MCH 30.2 26.0 - 34.0 PG    MCHC 31.5 30.0 - 36.5 g/dL    RDW 13.3 11.5 - 14.5 %    PLATELET 258 708 - 775 K/uL    MPV 12.9 8.9 - 12.9 FL    NRBC 0.0 0  WBC    ABSOLUTE NRBC 0.00 0.00 - 0.82 K/uL   METABOLIC PANEL, COMPREHENSIVE   Result Value Ref Range    Sodium 142 136 - 145 mmol/L    Potassium 4.9 3.5 - 5.1 mmol/L    Chloride 110 (H) 97 - 108 mmol/L    CO2 27 21 - 32 mmol/L    Anion gap 5 5 - 15 mmol/L    Glucose 110 (H) 65 - 100 mg/dL    BUN 18 6 - 20 MG/DL    Creatinine 0.70 0.55 - 1.02 MG/DL    BUN/Creatinine ratio 26 (H) 12 - 20      GFR est AA >60 >60 ml/min/1.73m2    GFR est non-AA >60 >60 ml/min/1.73m2    Calcium 9.4 8.5 - 10.1 MG/DL    Bilirubin, total 0.3 0.2 - 1.0 MG/DL    ALT (SGPT) 34 12 - 78 U/L    AST (SGOT) 17 15 - 37 U/L    Alk. phosphatase 101 45 - 117 U/L    Protein, total 7.6 6.4 - 8.2 g/dL    Albumin 4.1 3.5 - 5.0 g/dL    Globulin 3.5 2.0 - 4.0 g/dL    A-G Ratio 1.2 1.1 - 2.2             Physical Exam  Visit Vitals  /72 (BP 1 Location: Left upper arm, BP Patient Position: Sitting, BP Cuff Size: Large adult)   Pulse 71   Temp 97.8 °F (36.6 °C) (Oral)   Resp 16   Ht 5' 4\" (1.626 m)   Wt 165 lb (74.8 kg)   LMP 11/15/2010   SpO2 98%   BMI 28.32 kg/m²       Head: Normocephalic, without obvious abnormality, atraumatic  Eyes: conjunctivae/corneas clear. PERRL, EOM's intact. Neck: supple, symmetrical, trachea midline, no adenopathy, thyroid: not enlarged, symmetric, no tenderness/mass/nodules, no carotid bruit and no JVD  Lungs: clear to auscultation bilaterally  Heart: regular rate and rhythm, S1, S2 normal, no murmur, click, rub or gallop  Extremities: extremities normal, atraumatic, no cyanosis or edema  Pulses: 2+ and symmetric  Lymph nodes: Cervical, supraclavicular, and axillary nodes normal.  Neurologic: Grossly normal        ASSESSMENT and PLAN    ICD-10-CM ICD-9-CM    1. Dyslipidemia, goal LDL below 130  E78.5 272.4       2. Essential hypertension  I10 401.9 lisinopriL (PRINIVIL, ZESTRIL) 10 mg tablet      LIPID PANEL      METABOLIC PANEL, COMPREHENSIVE      LIPID PANEL      METABOLIC PANEL, COMPREHENSIVE      3. Vitamin D deficiency  E55.9 268.9         Diagnoses and all orders for this visit:    1. Dyslipidemia, goal LDL below 130    2. Essential hypertension  -     lisinopriL (PRINIVIL, ZESTRIL) 10 mg tablet; Take 1 Tablet by mouth two (2) times a day. -     LIPID PANEL; Future  -     METABOLIC PANEL, COMPREHENSIVE; Future    3. Vitamin D deficiency    Follow-up and Dispositions    Return in about 6 months (around 4/5/2023) for F/U HTN and CHOL, f/u Vitamin D deficiency, F/U weight concerns.          lab results and schedule of future lab studies reviewed with patient  reviewed diet, exercise and weight control  cardiovascular risk and specific lipid/LDL goals reviewed  reviewed medications and side effects in detail  Please call my office if there are any questions- [phone]. I will arrange for follow up after the lab tests done from today return  Recommended a weekly \"heart check. \" I went into detail how to do this. Call for refills on medications as needed. Discussed expected course/resolution/complications of diagnosis in detail with patient. Medication risks/benefits/costs/interactions/alternatives discussed with patient. Pt was given an after visit summary which includes diagnoses, current medications & vitals. Pt expressed understanding with the diagnosis and plan    Total 30 minutes  re: Review of  the proper technique of checking the blood pressure- check it on an average day only, not on a stressful day, sitting, no exercise for at least 1 hour and not experiencing any new pain( chronic pain is OK). Patient encouraged to check BP sitting and standing at least once a month and to report these readings to me if > 140/ 90 on average , or if the standing BP is >  15 points lower than the sitting. Always check it twice and if there is > 5 points decrease from the previous reading( top reading or systolic) keep checking it until it does not drop 5 points. Write only this final reading down, not the preceding readings. If out of these readings there is only 1 out of 4 or less > 824, or > 90 diastolic then your blood pressure is OK; it needs further treatment if it is above this. Also, don't forget,  as noted above, to check your blood pressure standing once a month; this is to detect a drop in your BP that might lead to fainting and serious injury; you check it standing with your arm hanging straight down and relaxed. Check it twice waiting 1 minute between the two readings.  If, with either one of these 2 readings there is a > 15 point drop of the systolic compared to your sitting pressure( done before the standing BP), then let me know. Following these guidelines, continue to check your BP and write down only the ones described above and it will help me to effectively treat your blood pressure. Reviewed symptoms, or lack thereof, of hypertension and elevated cholesterol. Regular exercise is very important to your health; it helps mentally, physically, socially; it prevents injuries if done properly. Exercise, even as simple as walking 20-30 minutes daily has major benefits to your health even though your \"numbers\" are the same in the lab. See if you can add this into your daily regimen and after a few months it will become a regular habit-\"just something you do,\" like brushing your teeth. A combination of aerobic exercise and strengthening and stretching is felt to be the best for you, so this should be your ultimate goal.   This can be done in the privacy of your home or in a group setting as at the gym  Some prefer having a , others prefer to do exercise in groups or individually. Do what \"works\" for you. You need to make it simple and \"fun,\" or you most likely will not continue it. Recommended a weekly \"heart check. \" I went into detail how to do this. Also, discussed symptoms of concern that were noted today in the note above, treatment options( including doing nothing), when to follow up before recommended time frame. Also, answered all questions. Because her vitamin D remains low, I once again encouraged she take the vitamin D regularly and that she use the 5000 units. She can take it at the same time as her BP medicine which would make compliance better. Because she had trouble with her lab work charges lat visit, she is going to take the paper request for labs to a separate [facility] station to have it drawn. I encouraged her to continue working on her weight and to check the BP on at least a monthly basis.  She asked about different vitamin supplements and I told her we just want her to concentrate on the vitamin D. She did have an episode of COVID back in May and fever and aching were the only initial symptoms but as those symptoms got better, she developed a cough. The cough remains today but it is markedly improved. I encouraged her to get the booster shot for the COVID at this point.     This document was written by Baylee Pandey, as dictated by Lexx Hendrix MD.

## 2022-10-05 NOTE — PROGRESS NOTES
Chief Complaint   Patient presents with    Hypertension    1. \"Have you been to the ER, urgent care clinic since your last visit? Hospitalized since your last visit? \" No    2. \"Have you seen or consulted any other health care providers outside of the [address] [place] [address] since your last visit? \" No     3. For patients over 45: Has the patient had a colonoscopy? Yes - no Care Gap present     If the patient is female:    4. For patients over 40: Has the patient had a mammogram? Yes - no Care Gap present    5. For patients over 21: Has the patient had a pap smear?  Yes - no Care Gap present

## 2023-02-01 ENCOUNTER — OFFICE VISIT (OUTPATIENT)
Dept: FAMILY MEDICINE CLINIC | Age: 63
End: 2023-02-01
Payer: COMMERCIAL

## 2023-02-01 VITALS
HEART RATE: 77 BPM | BODY MASS INDEX: 27.83 KG/M2 | DIASTOLIC BLOOD PRESSURE: 70 MMHG | RESPIRATION RATE: 16 BRPM | WEIGHT: 163 LBS | OXYGEN SATURATION: 9 % | SYSTOLIC BLOOD PRESSURE: 122 MMHG | HEIGHT: 64 IN | TEMPERATURE: 98 F

## 2023-02-01 DIAGNOSIS — Z00.00 PHYSICAL EXAM: Primary | ICD-10-CM

## 2023-02-01 DIAGNOSIS — M79.2 NEUROPATHIC PAIN OF RIGHT SHOULDER: ICD-10-CM

## 2023-02-01 DIAGNOSIS — E78.5 DYSLIPIDEMIA, GOAL LDL BELOW 130: ICD-10-CM

## 2023-02-01 DIAGNOSIS — E55.9 VITAMIN D DEFICIENCY: ICD-10-CM

## 2023-02-01 DIAGNOSIS — M47.812 SPONDYLOSIS OF CERVICAL REGION WITHOUT MYELOPATHY OR RADICULOPATHY: ICD-10-CM

## 2023-02-01 DIAGNOSIS — I10 ESSENTIAL HYPERTENSION: ICD-10-CM

## 2023-02-01 PROCEDURE — 3078F DIAST BP <80 MM HG: CPT | Performed by: FAMILY MEDICINE

## 2023-02-01 PROCEDURE — 3074F SYST BP LT 130 MM HG: CPT | Performed by: FAMILY MEDICINE

## 2023-02-01 PROCEDURE — 99396 PREV VISIT EST AGE 40-64: CPT | Performed by: FAMILY MEDICINE

## 2023-02-01 NOTE — PROGRESS NOTES
HISTORY OF PRESENT ILLNESS  HPI  Colton Parker is a 60 y.o. female with a history of HTN, cervical spondylosis, menopausal syndrome and hyperlipidemia with LDL goal <130, who presents to the office today for a physical, c/o right shoulder pain, and for f/u of these health problems. Pt ate eggs, tomatoes, apple chips and salmon, with coffee around 10 am.    Pt reports she has had some right shoulder pain that radiates down to the elbow for the past week. It initially came on after doing regular activity. She denies any fall or similar provacative event. The pain is aggravated with placing the arm on a table. It is improved with raising the arm vertically, but is not affected with other movement, pushing on the area, or moving the neck. Pt has has similar pain in the past but it resolved on its own quickly. Pt has used icy hot patches and 2 tablets of Advil BID. Pt denies unusual SOB, chest pain, and any recent ER visits or hospitalizations. Past Medical History:   Diagnosis Date    DJD (degenerative joint disease), cervical 9/6/2010    Elevated cholesterol 8/09    HTN (hypertension) 6/09    Vitamin D deficiency      Past Surgical History:   Procedure Laterality Date    HX APPENDECTOMY  1985    HX REFRACTIVE SURGERY  2008     Current Outpatient Medications on File Prior to Visit   Medication Sig Dispense Refill    [DISCONTINUED] lisinopriL (PRINIVIL, ZESTRIL) 10 mg tablet Take 1 Tablet by mouth two (2) times a day. 60 Tablet 5     No current facility-administered medications on file prior to visit.      Allergies   Allergen Reactions    Aspirin Nausea and Vomiting     Family History   Problem Relation Age of Onset    Hypertension Mother     Stroke Mother         age 40    Hypertension Maternal Grandmother     Stroke Maternal Grandmother      Social History     Socioeconomic History    Marital status:    Occupational History    Occupation:    Tobacco Use    Smoking status: Never    Smokeless tobacco: Never   Vaping Use    Vaping Use: Never used   Substance and Sexual Activity    Alcohol use: Yes    Drug use: Never    Sexual activity: Yes     Partners: Male   Other Topics Concern    Special Diet Yes     Comment: healthy    Exercise Yes     Comment: aerobic     Social Determinants of Health     Financial Resource Strain: Low Risk     Difficulty of Paying Living Expenses: Not hard at all   Food Insecurity: No Food Insecurity    Worried About Running Out of Food in the Last Year: Never true    Ran Out of Food in the Last Year: Never true           Review of Systems   Constitutional:  Negative for chills, diaphoresis, fever, malaise/fatigue and weight loss. HENT:  Negative for congestion, ear discharge, ear pain, hearing loss, nosebleeds, sore throat and tinnitus. Eyes:  Negative for blurred vision, double vision, photophobia, pain, discharge and redness. Respiratory:  Negative for cough, hemoptysis, sputum production, shortness of breath, wheezing and stridor. Cardiovascular:  Negative for chest pain, palpitations, orthopnea, claudication, leg swelling and PND. Gastrointestinal:  Negative for abdominal pain, blood in stool, constipation, diarrhea, heartburn, melena, nausea and vomiting. Genitourinary:  Negative for dysuria, flank pain, frequency, hematuria and urgency. Musculoskeletal:  Negative for back pain, falls, joint pain, myalgias and neck pain. Skin:  Negative for itching and rash. Neurological:  Negative for dizziness, tingling, tremors, sensory change, speech change, focal weakness, seizures, loss of consciousness, weakness and headaches. Endo/Heme/Allergies:  Negative for environmental allergies and polydipsia. Does not bruise/bleed easily. Psychiatric/Behavioral:  Negative for depression, hallucinations, memory loss, substance abuse and suicidal ideas. The patient is not nervous/anxious and does not have insomnia.     Results for orders placed or performed in visit on 06/01/21   LIPID PANEL   Result Value Ref Range    Cholesterol, total 294 (H) <200 MG/DL    Triglyceride 219 (H) <150 MG/DL    HDL Cholesterol 77 MG/DL    LDL, calculated 173.2 (H) 0 - 100 MG/DL    VLDL, calculated 43.8 MG/DL    CHOL/HDL Ratio 3.8 0.0 - 5.0     TSH 3RD GENERATION   Result Value Ref Range    TSH 1.64 0.36 - 3.74 uIU/mL   VITAMIN D, 25 HYDROXY   Result Value Ref Range    Vitamin D 25-Hydroxy 15.9 (L) 30 - 100 ng/mL   URINALYSIS W/MICROSCOPIC   Result Value Ref Range    Color YELLOW/STRAW      Appearance CLEAR CLEAR      Specific gravity 1.020 1.003 - 1.030      pH (UA) 5.0 5.0 - 8.0      Protein Negative Negative mg/dL    Glucose Negative Negative mg/dL    Ketone Negative Negative mg/dL    Bilirubin Negative Negative      Blood Negative Negative      Urobilinogen 0.2 0.2 - 1.0 EU/dL    Nitrites Negative Negative      Leukocyte Esterase TRACE (A) Negative      WBC 0-4 0 - 4 /hpf    RBC 0-5 0 - 5 /hpf    Epithelial cells FEW FEW /lpf    Bacteria Negative Negative /hpf    Hyaline cast 0-2 0 - 5 /lpf   CBC W/O DIFF   Result Value Ref Range    WBC 7.5 3.6 - 11.0 K/uL    RBC 4.51 3.80 - 5.20 M/uL    HGB 13.6 11.5 - 16.0 g/dL    HCT 43.2 35.0 - 47.0 %    MCV 95.8 80.0 - 99.0 FL    MCH 30.2 26.0 - 34.0 PG    MCHC 31.5 30.0 - 36.5 g/dL    RDW 13.3 11.5 - 14.5 %    PLATELET 971 886 - 155 K/uL    MPV 12.9 8.9 - 12.9 FL    NRBC 0.0 0  WBC    ABSOLUTE NRBC 0.00 0.00 - 6.98 K/uL   METABOLIC PANEL, COMPREHENSIVE   Result Value Ref Range    Sodium 142 136 - 145 mmol/L    Potassium 4.9 3.5 - 5.1 mmol/L    Chloride 110 (H) 97 - 108 mmol/L    CO2 27 21 - 32 mmol/L    Anion gap 5 5 - 15 mmol/L    Glucose 110 (H) 65 - 100 mg/dL    BUN 18 6 - 20 MG/DL    Creatinine 0.70 0.55 - 1.02 MG/DL    BUN/Creatinine ratio 26 (H) 12 - 20      GFR est AA >60 >60 ml/min/1.73m2    GFR est non-AA >60 >60 ml/min/1.73m2    Calcium 9.4 8.5 - 10.1 MG/DL    Bilirubin, total 0.3 0.2 - 1.0 MG/DL    ALT (SGPT) 34 12 - 78 U/L    AST (SGOT) 17 15 - 37 U/L    Alk. phosphatase 101 45 - 117 U/L    Protein, total 7.6 6.4 - 8.2 g/dL    Albumin 4.1 3.5 - 5.0 g/dL    Globulin 3.5 2.0 - 4.0 g/dL    A-G Ratio 1.2 1.1 - 2.2             Physical Exam  Visit Vitals  /70 (BP 1 Location: Left upper arm, BP Patient Position: Sitting, BP Cuff Size: Adult)   Pulse 77   Temp 98 °F (36.7 °C) (Temporal)   Resp 16   Ht 5' 4\" (1.626 m)   Wt 163 lb (73.9 kg)   LMP 11/15/2010   SpO2 (!) 9%   BMI 27.98 kg/m²         General:  Alert, cooperative, no distress, appears stated age. Head:  Normocephalic, without obvious abnormality, atraumatic. Eyes:  Conjunctivae/corneas clear. PERRL, EOMs intact. Fundi benign. Ears:  Normal TMs and external ear canals both ears. Nose: Nares normal. Septum midline. Mucosa normal. No drainage or sinus tenderness. Throat: Lips, mucosa, and tongue normal. Teeth and gums normal.   Neck: Supple, symmetrical, trachea midline, no adenopathy, thyroid: no enlargement/tenderness/nodules, no carotid bruit and no JVD. She has good ROM in all directions. She appears to have some slight discomfort when she rotates to the right but states it does not reproduce her right shoulder blade pain. No discomfort with flexion of the neck or rotation to the left side. Back:   Symmetric, no curvature. ROM normal. No CVA tenderness. Lungs:   Clear to auscultation bilaterally. Chest wall:  No tenderness or deformity. Heart:  Regular rate and rhythm, S1, S2 normal, no murmur, click, rub or gallop. Abdomen:   Soft, non-tender. Bowel sounds normal. No masses,  No organomegaly. Extremities: Extremities normal, atraumatic, no cyanosis or edema. Both shoulders have full ROM without discomfort. Pulses: 2+ and symmetric all extremities. Skin: Skin color, texture, turgor normal. No rashes or lesions. Lymph nodes: Cervical, supraclavicular, and axillary nodes normal.   Neurologic: CNII-XII intact. Normal strength, sensation and reflexes throughout. ASSESSMENT and PLAN    ICD-10-CM ICD-9-CM    1. Physical exam  Z00.00 V70.9 LIPID PANEL      METABOLIC PANEL, COMPREHENSIVE      CBC W/O DIFF      URINALYSIS W/MICROSCOPIC      VITAMIN D, 25 HYDROXY      TSH 3RD GENERATION      LIPID PANEL      METABOLIC PANEL, COMPREHENSIVE      TSH 3RD GENERATION      VITAMIN D, 25 HYDROXY      CBC W/O DIFF      URINALYSIS W/MICROSCOPIC      LIPID PANEL      METABOLIC PANEL, COMPREHENSIVE      TSH 3RD GENERATION      VITAMIN D, 25 HYDROXY      CBC W/O DIFF      URINALYSIS W/MICROSCOPIC      2. Vitamin D deficiency  E55.9 268.9 VITAMIN D, 25 HYDROXY      VITAMIN D, 25 HYDROXY      VITAMIN D, 25 HYDROXY      3. Dyslipidemia, goal LDL below 130  E78.5 272.4 LIPID PANEL      METABOLIC PANEL, COMPREHENSIVE      4. Essential hypertension  K61 607.4 METABOLIC PANEL, COMPREHENSIVE      CBC W/O DIFF      lisinopriL (PRINIVIL, ZESTRIL) 10 mg tablet      5. Spondylosis of cervical region without myelopathy or radiculopathy  M47.812 721.0       6. Neuropathic pain of right shoulder  M79.2 354.9     into right arm        Diagnoses and all orders for this visit:    1. Physical exam  -     LIPID PANEL; Future  -     METABOLIC PANEL, COMPREHENSIVE; Future  -     CBC W/O DIFF; Future  -     URINALYSIS W/MICROSCOPIC; Future  -     VITAMIN D, 25 HYDROXY; Future  -     TSH 3RD GENERATION; Future  -     LIPID PANEL; Future  -     METABOLIC PANEL, COMPREHENSIVE; Future  -     TSH 3RD GENERATION; Future  -     VITAMIN D, 25 HYDROXY; Future  -     CBC W/O DIFF; Future  -     URINALYSIS W/MICROSCOPIC; Future    2. Vitamin D deficiency  -     VITAMIN D, 25 HYDROXY; Future  -     VITAMIN D, 25 HYDROXY; Future    3. Dyslipidemia, goal LDL below 130  -     LIPID PANEL; Future  -     METABOLIC PANEL, COMPREHENSIVE; Future    4. Essential hypertension  -     METABOLIC PANEL, COMPREHENSIVE; Future  -     CBC W/O DIFF; Future  -     lisinopriL (PRINIVIL, ZESTRIL) 10 mg tablet;  Take 2 Tablets by mouth daily.    5. Spondylosis of cervical region without myelopathy or radiculopathy    6. Neuropathic pain of right shoulder  Comments:  into right arm    Follow-up and Dispositions    Return in about 6 months (around 8/1/2023) for  F/U HTN and CHOL, F/U weight concerns, osteoarthritis, f/u Vitamin D deficiency yearly. lab results and schedule of future lab studies reviewed with patient  reviewed diet, exercise and weight control  cardiovascular risk and specific lipid/LDL goals reviewed  reviewed medications and side effects in detail  Please call my office if there are any questions- [phone]. I will arrange for follow up after the lab tests done from today return  Recommended a weekly \"heart check. \" I went into detail how to do this. Call for refills on medications as needed. Discussed expected course/resolution/complications of diagnosis in detail with patient. Medication risks/benefits/costs/interactions/alternatives discussed with patient. Pt was given an after visit summary which includes diagnoses, current medications & vitals. Pt expressed understanding with the diagnosis and plan    BMI is significantly elevated- in the overweight range. I reviewed diet, exercise and weight control. Discussed weight control in detail, the importance of mainly decreased carbs, and for weight maintenance, exercise; discussed different diets and that it isn't as important to watch the type of foods as it is to decrease calorie intake no matter what type of diet you do, etc. Mindful eating also discussed- Naturally Slim( now [employer]) or Noom are 2 options  Total 30 minutes  re: Review of  the proper technique of checking the blood pressure- check it on an average day only, not on a stressful day, sitting, no exercise for at least 1 hour and not experiencing any new pain( chronic pain is OK).  Patient encouraged to check BP sitting and standing at least once a month and to report these readings to me if > 140/ 90 on average , or if the standing BP is >  15 points lower than the sitting. Always check it twice and if there is > 5 points decrease from the previous reading( top reading or systolic) keep checking it until it does not drop 5 points. Write only this final reading down, not the preceding readings. If out of these readings there is only 1 out of 4 or less > 827, or > 90 diastolic then your blood pressure is OK; it needs further treatment if it is above this. Also, don't forget,  as noted above, to check your blood pressure standing once a month; this is to detect a drop in your BP that might lead to fainting and serious injury; you check it standing with your arm hanging straight down and relaxed. Check it twice waiting 1 minute between the two readings. If, with either one of these 2 readings there is a > 15 point drop of the systolic compared to your sitting pressure( done before the standing BP), then let me know. Following these guidelines, continue to check your BP and write down only the ones described above and it will help me to effectively treat your blood pressure. Reviewed symptoms, or lack thereof, of hypertension and elevated cholesterol. Regular exercise is very important to your health; it helps mentally, physically, socially; it prevents injuries if done properly. Exercise, even as simple as walking 20-30 minutes daily has major benefits to your health even though your \"numbers\" are the same in the lab. See if you can add this into your daily regimen and after a few months it will become a regular habit-\"just something you do,\" like brushing your teeth. A combination of aerobic exercise and strengthening and stretching is felt to be the best for you, so this should be your ultimate goal.   This can be done in the privacy of your home or in a group setting as at the gym  Some prefer having a , others prefer to do exercise in groups or individually. Do what \"works\" for you.  You need to make it simple and \"fun,\" or you most likely will not continue it. Recommended a weekly \"heart check. \" I went into detail how to do this. Also, discussed symptoms of concern that were noted today in the note above, treatment options( including doing nothing), when to follow up before recommended time frame. Also, answered all questions. 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 This document was written by Nieves Zeng, as dictated by Xuan Lea MD.   I have reviewed and agree with the above note and have made corrections where appropriate Walter Hooker M.D.

## 2023-02-01 NOTE — PROGRESS NOTES
Chief Complaint   Patient presents with    Physical    Shoulder Pain     rt    1. \"Have you been to the ER, urgent care clinic since your last visit? Hospitalized since your last visit? \" No    2. \"Have you seen or consulted any other health care providers outside of the [address] [place] [address] since your last visit? \" No     3. For patients over 45: Has the patient had a colonoscopy? Yes - no Care Gap present     If the patient is female:    4. For patients over 40: Has the patient had a mammogram? Yes - no Care Gap present    5. For patients over 21: Has the patient had a pap smear?  Yes - no Care Gap present

## 2023-02-02 RX ORDER — LISINOPRIL 10 MG/1
20 TABLET ORAL DAILY
Qty: 90 TABLET | Refills: 1 | Status: SHIPPED | OUTPATIENT
Start: 2023-02-02

## 2023-02-15 ENCOUNTER — TELEPHONE (OUTPATIENT)
Dept: FAMILY MEDICINE CLINIC | Age: 63
End: 2023-02-15

## 2023-02-15 NOTE — TELEPHONE ENCOUNTER
----- Message from Derrickmele Parker sent at 2/15/2023 11:47 AM EST -----  Subject: Message to Provider    QUESTIONS  Information for Provider? Patient is calling in to reschedule her lab   appointment. She is unable to make it to her appointment tomorrow and   would like to reschedule. She is concerned she will be charged if she   misses her appointment.  ---------------------------------------------------------------------------  --------------  Ezekiel ANTON  [phone]; OK to leave message on voicemail  ---------------------------------------------------------------------------  --------------  SCRIPT ANSWERS  Relationship to Patient?  Self

## 2023-02-17 ENCOUNTER — APPOINTMENT (OUTPATIENT)
Dept: FAMILY MEDICINE CLINIC | Age: 63
End: 2023-02-17

## 2023-02-18 LAB
ALBUMIN SERPL-MCNC: 4.1 G/DL (ref 3.5–5)
ALBUMIN/GLOB SERPL: 1.2 (ref 1.1–2.2)
ALP SERPL-CCNC: 76 U/L (ref 45–117)
ALT SERPL-CCNC: 35 U/L (ref 12–78)
ANION GAP SERPL CALC-SCNC: 3 MMOL/L (ref 5–15)
APPEARANCE UR: CLEAR
AST SERPL-CCNC: 17 U/L (ref 15–37)
BACTERIA URNS QL MICRO: NEGATIVE /HPF
BILIRUB SERPL-MCNC: 0.4 MG/DL (ref 0.2–1)
BILIRUB UR QL: NEGATIVE
BUN SERPL-MCNC: 15 MG/DL (ref 6–20)
BUN/CREAT SERPL: 17 (ref 12–20)
CALCIUM SERPL-MCNC: 9.8 MG/DL (ref 8.5–10.1)
CHLORIDE SERPL-SCNC: 109 MMOL/L (ref 97–108)
CHOLEST SERPL-MCNC: 298 MG/DL
CO2 SERPL-SCNC: 29 MMOL/L (ref 21–32)
COLOR UR: ABNORMAL
CREAT SERPL-MCNC: 0.87 MG/DL (ref 0.55–1.02)
EPITH CASTS URNS QL MICRO: ABNORMAL /LPF
GLOBULIN SER CALC-MCNC: 3.4 G/DL (ref 2–4)
GLUCOSE SERPL-MCNC: 110 MG/DL (ref 65–100)
GLUCOSE UR STRIP.AUTO-MCNC: NEGATIVE MG/DL
HDLC SERPL-MCNC: 71 MG/DL
HDLC SERPL: 4.2 (ref 0–5)
HGB UR QL STRIP: NEGATIVE
HYALINE CASTS URNS QL MICRO: ABNORMAL /LPF (ref 0–5)
KETONES UR QL STRIP.AUTO: NEGATIVE MG/DL
LDLC SERPL CALC-MCNC: 186 MG/DL (ref 0–100)
LEUKOCYTE ESTERASE UR QL STRIP.AUTO: ABNORMAL
NITRITE UR QL STRIP.AUTO: NEGATIVE
PH UR STRIP: 5 (ref 5–8)
POTASSIUM SERPL-SCNC: 4.4 MMOL/L (ref 3.5–5.1)
PROT SERPL-MCNC: 7.5 G/DL (ref 6.4–8.2)
PROT UR STRIP-MCNC: NEGATIVE MG/DL
RBC #/AREA URNS HPF: ABNORMAL /HPF (ref 0–5)
SODIUM SERPL-SCNC: 141 MMOL/L (ref 136–145)
SP GR UR REFRACTOMETRY: 1.02 (ref 1–1.03)
TRIGL SERPL-MCNC: 205 MG/DL (ref ?–150)
UROBILINOGEN UR QL STRIP.AUTO: 0.2 EU/DL (ref 0.2–1)
VLDLC SERPL CALC-MCNC: 41 MG/DL
WBC URNS QL MICRO: ABNORMAL /HPF (ref 0–4)

## 2023-05-30 RX ORDER — LISINOPRIL 20 MG/1
TABLET ORAL
Qty: 90 TABLET | Refills: 3 | Status: SHIPPED | OUTPATIENT
Start: 2023-05-30

## 2024-02-06 ENCOUNTER — OFFICE VISIT (OUTPATIENT)
Age: 64
End: 2024-02-06
Payer: COMMERCIAL

## 2024-02-06 VITALS
BODY MASS INDEX: 26.98 KG/M2 | SYSTOLIC BLOOD PRESSURE: 132 MMHG | RESPIRATION RATE: 16 BRPM | TEMPERATURE: 97 F | WEIGHT: 158 LBS | DIASTOLIC BLOOD PRESSURE: 68 MMHG | OXYGEN SATURATION: 99 % | HEART RATE: 68 BPM | HEIGHT: 64 IN

## 2024-02-06 DIAGNOSIS — N95.1 MENOPAUSAL SYNDROME (HOT FLASHES): ICD-10-CM

## 2024-02-06 DIAGNOSIS — M47.812 SPONDYLOSIS OF CERVICAL REGION WITHOUT MYELOPATHY OR RADICULOPATHY: ICD-10-CM

## 2024-02-06 DIAGNOSIS — E55.9 VITAMIN D DEFICIENCY: ICD-10-CM

## 2024-02-06 DIAGNOSIS — Z00.00 PHYSICAL EXAM: Primary | ICD-10-CM

## 2024-02-06 DIAGNOSIS — E78.5 DYSLIPIDEMIA, GOAL LDL BELOW 130: ICD-10-CM

## 2024-02-06 DIAGNOSIS — I10 ESSENTIAL (PRIMARY) HYPERTENSION: ICD-10-CM

## 2024-02-06 DIAGNOSIS — M47.812 SPONDYLOSIS WITHOUT MYELOPATHY OR RADICULOPATHY, CERVICAL REGION: ICD-10-CM

## 2024-02-06 DIAGNOSIS — Z11.59 NEED FOR HEPATITIS C SCREENING TEST: ICD-10-CM

## 2024-02-06 PROCEDURE — 3078F DIAST BP <80 MM HG: CPT | Performed by: FAMILY MEDICINE

## 2024-02-06 PROCEDURE — 3075F SYST BP GE 130 - 139MM HG: CPT | Performed by: FAMILY MEDICINE

## 2024-02-06 PROCEDURE — 99396 PREV VISIT EST AGE 40-64: CPT | Performed by: FAMILY MEDICINE

## 2024-02-06 RX ORDER — LISINOPRIL 20 MG/1
20 TABLET ORAL DAILY
Qty: 90 TABLET | Refills: 3 | Status: SHIPPED | OUTPATIENT
Start: 2024-02-06

## 2024-02-06 SDOH — ECONOMIC STABILITY: FOOD INSECURITY: WITHIN THE PAST 12 MONTHS, THE FOOD YOU BOUGHT JUST DIDN'T LAST AND YOU DIDN'T HAVE MONEY TO GET MORE.: NEVER TRUE

## 2024-02-06 SDOH — ECONOMIC STABILITY: FOOD INSECURITY: WITHIN THE PAST 12 MONTHS, YOU WORRIED THAT YOUR FOOD WOULD RUN OUT BEFORE YOU GOT MONEY TO BUY MORE.: NEVER TRUE

## 2024-02-06 SDOH — ECONOMIC STABILITY: INCOME INSECURITY: HOW HARD IS IT FOR YOU TO PAY FOR THE VERY BASICS LIKE FOOD, HOUSING, MEDICAL CARE, AND HEATING?: NOT HARD AT ALL

## 2024-02-06 SDOH — ECONOMIC STABILITY: HOUSING INSECURITY
IN THE LAST 12 MONTHS, WAS THERE A TIME WHEN YOU DID NOT HAVE A STEADY PLACE TO SLEEP OR SLEPT IN A SHELTER (INCLUDING NOW)?: NO

## 2024-02-06 ASSESSMENT — ENCOUNTER SYMPTOMS
WHEEZING: 0
STRIDOR: 0
DIARRHEA: 0
CONSTIPATION: 0
EYE DISCHARGE: 0
NAUSEA: 0
SORE THROAT: 0
SHORTNESS OF BREATH: 0
COUGH: 0
VOMITING: 0
BLOOD IN STOOL: 0
ABDOMINAL PAIN: 0
EYE REDNESS: 0
BACK PAIN: 0
EYE PAIN: 0

## 2024-02-06 ASSESSMENT — PATIENT HEALTH QUESTIONNAIRE - PHQ9
SUM OF ALL RESPONSES TO PHQ QUESTIONS 1-9: 0
SUM OF ALL RESPONSES TO PHQ9 QUESTIONS 1 & 2: 0
SUM OF ALL RESPONSES TO PHQ QUESTIONS 1-9: 0
2. FEELING DOWN, DEPRESSED OR HOPELESS: 0
SUM OF ALL RESPONSES TO PHQ QUESTIONS 1-9: 0
SUM OF ALL RESPONSES TO PHQ QUESTIONS 1-9: 0
1. LITTLE INTEREST OR PLEASURE IN DOING THINGS: 0

## 2024-02-06 NOTE — PROGRESS NOTES
Chief Complaint   Patient presents with    Annual Exam      \"Have you been to the ER, urgent care clinic since your last visit?  Hospitalized since your last visit?\"    NO    “Have you seen or consulted any other health care providers outside of [facility] since your last visit?”    NO    “Have you had a colorectal cancer screening such as a colonoscopy/FIT/Cologuard?    NO     Have you had a mammogram?”   scheduled

## 2024-02-06 NOTE — PROGRESS NOTES
Subjective     HPI    Elvira Gitlina is a 63 y.o. female who comes in with HTN, cervical spondylosis, menopausal syndrome and hyperlipidemia with LDL goal <130, who presents to the office today for a physical, and for f/u of these health problems. Pt is not fasting today (ate sausage, coffee, and water around 10 AM).     Pt takes her lisinopril 20 mg either at lunch time, or later in the afternoon.     Pt walks her dog for exercise regularly.     Pt denies unusual SOB, chest pain, and any recent ER visits or hospitalizations.     Past Medical History:   Diagnosis Date    DJD (degenerative joint disease), cervical 9/6/2010    Elevated cholesterol 8/09    HTN (hypertension) 6/09    Vitamin D deficiency      Past Surgical History:   Procedure Laterality Date    APPENDECTOMY  1985    REFRACTIVE SURGERY  2008     No current outpatient medications on file prior to visit.     No current facility-administered medications on file prior to visit.     Allergies   Allergen Reactions    Aspirin Nausea And Vomiting     Family History   Problem Relation Age of Onset    Stroke Maternal Grandmother     Hypertension Maternal Grandmother     Stroke Mother         age 44    Hypertension Mother      Social History     Socioeconomic History    Marital status:      Spouse name: None    Number of children: None    Years of education: None    Highest education level: None   Tobacco Use    Smoking status: Never    Smokeless tobacco: Never   Substance and Sexual Activity    Alcohol use: Yes    Drug use: Never     Social Determinants of Health     Financial Resource Strain: Low Risk  (2/6/2024)    Overall Financial Resource Strain (CARDIA)     Difficulty of Paying Living Expenses: Not hard at all   Food Insecurity: No Food Insecurity (2/6/2024)    Hunger Vital Sign     Worried About Running Out of Food in the Last Year: Never true     Ran Out of Food in the Last Year: Never true   Transportation Needs: Unknown (2/6/2024)    PRAPARE -

## 2024-02-07 LAB
25(OH)D3 SERPL-MCNC: 18.4 NG/ML (ref 30–100)
ALBUMIN SERPL-MCNC: 4.3 G/DL (ref 3.5–5)
ALBUMIN/GLOB SERPL: 1.4 (ref 1.1–2.2)
ALP SERPL-CCNC: 78 U/L (ref 45–117)
ALT SERPL-CCNC: 30 U/L (ref 12–78)
ANION GAP SERPL CALC-SCNC: 3 MMOL/L (ref 5–15)
APPEARANCE UR: ABNORMAL
AST SERPL-CCNC: 14 U/L (ref 15–37)
BACTERIA URNS QL MICRO: NEGATIVE /HPF
BILIRUB SERPL-MCNC: 0.3 MG/DL (ref 0.2–1)
BILIRUB UR QL: NEGATIVE
BUN SERPL-MCNC: 18 MG/DL (ref 6–20)
BUN/CREAT SERPL: 22 (ref 12–20)
CALCIUM SERPL-MCNC: 10.1 MG/DL (ref 8.5–10.1)
CHLORIDE SERPL-SCNC: 110 MMOL/L (ref 97–108)
CHOLEST SERPL-MCNC: 291 MG/DL
CO2 SERPL-SCNC: 26 MMOL/L (ref 21–32)
COLOR UR: ABNORMAL
CREAT SERPL-MCNC: 0.83 MG/DL (ref 0.55–1.02)
EPITH CASTS URNS QL MICRO: ABNORMAL /LPF
ERYTHROCYTE [DISTWIDTH] IN BLOOD BY AUTOMATED COUNT: 13.2 % (ref 11.5–14.5)
GLOBULIN SER CALC-MCNC: 3 G/DL (ref 2–4)
GLUCOSE SERPL-MCNC: 97 MG/DL (ref 65–100)
GLUCOSE UR STRIP.AUTO-MCNC: NEGATIVE MG/DL
HCT VFR BLD AUTO: 42.5 % (ref 35–47)
HCV AB SER IA-ACNC: 0.09 INDEX
HCV AB SERPL QL IA: NONREACTIVE
HDLC SERPL-MCNC: 76 MG/DL
HDLC SERPL: 3.8 (ref 0–5)
HGB BLD-MCNC: 14.1 G/DL (ref 11.5–16)
HGB UR QL STRIP: NEGATIVE
HYALINE CASTS URNS QL MICRO: ABNORMAL /LPF (ref 0–5)
KETONES UR QL STRIP.AUTO: NEGATIVE MG/DL
LDLC SERPL CALC-MCNC: 170.2 MG/DL (ref 0–100)
LEUKOCYTE ESTERASE UR QL STRIP.AUTO: ABNORMAL
MCH RBC QN AUTO: 30.5 PG (ref 26–34)
MCHC RBC AUTO-ENTMCNC: 33.2 G/DL (ref 30–36.5)
MCV RBC AUTO: 92 FL (ref 80–99)
NITRITE UR QL STRIP.AUTO: NEGATIVE
NRBC # BLD: 0 K/UL (ref 0–0.01)
NRBC BLD-RTO: 0 PER 100 WBC
PH UR STRIP: 5.5 (ref 5–8)
PLATELET # BLD AUTO: 273 K/UL (ref 150–400)
PMV BLD AUTO: 12.6 FL (ref 8.9–12.9)
POTASSIUM SERPL-SCNC: 4.6 MMOL/L (ref 3.5–5.1)
PROT SERPL-MCNC: 7.3 G/DL (ref 6.4–8.2)
PROT UR STRIP-MCNC: NEGATIVE MG/DL
RBC # BLD AUTO: 4.62 M/UL (ref 3.8–5.2)
RBC #/AREA URNS HPF: ABNORMAL /HPF (ref 0–5)
SODIUM SERPL-SCNC: 139 MMOL/L (ref 136–145)
SP GR UR REFRACTOMETRY: 1.02 (ref 1–1.03)
TRIGL SERPL-MCNC: 224 MG/DL
UROBILINOGEN UR QL STRIP.AUTO: 0.2 EU/DL (ref 0.2–1)
VLDLC SERPL CALC-MCNC: 44.8 MG/DL
WBC # BLD AUTO: 8.6 K/UL (ref 3.6–11)
WBC URNS QL MICRO: ABNORMAL /HPF (ref 0–4)

## 2024-03-08 LAB — MAMMOGRAPHY, EXTERNAL: NORMAL

## 2025-01-07 ENCOUNTER — OFFICE VISIT (OUTPATIENT)
Age: 65
End: 2025-01-07
Payer: COMMERCIAL

## 2025-01-07 VITALS
DIASTOLIC BLOOD PRESSURE: 81 MMHG | RESPIRATION RATE: 14 BRPM | SYSTOLIC BLOOD PRESSURE: 127 MMHG | TEMPERATURE: 97.7 F | HEIGHT: 64 IN | OXYGEN SATURATION: 96 % | BODY MASS INDEX: 28.03 KG/M2 | HEART RATE: 96 BPM | WEIGHT: 164.2 LBS

## 2025-01-07 DIAGNOSIS — J20.8 ACUTE VIRAL BRONCHITIS: Primary | ICD-10-CM

## 2025-01-07 PROCEDURE — 3074F SYST BP LT 130 MM HG: CPT | Performed by: NURSE PRACTITIONER

## 2025-01-07 PROCEDURE — 3079F DIAST BP 80-89 MM HG: CPT | Performed by: NURSE PRACTITIONER

## 2025-01-07 PROCEDURE — 99213 OFFICE O/P EST LOW 20 MIN: CPT | Performed by: NURSE PRACTITIONER

## 2025-01-07 PROCEDURE — 1123F ACP DISCUSS/DSCN MKR DOCD: CPT | Performed by: NURSE PRACTITIONER

## 2025-01-07 RX ORDER — BENZONATATE 200 MG/1
200 CAPSULE ORAL 3 TIMES DAILY PRN
Qty: 30 CAPSULE | Refills: 0 | Status: SHIPPED | OUTPATIENT
Start: 2025-01-07 | End: 2025-01-14

## 2025-01-07 RX ORDER — CODEINE PHOSPHATE AND GUAIFENESIN 10; 100 MG/5ML; MG/5ML
10 SOLUTION ORAL 3 TIMES DAILY PRN
Qty: 237 ML | Refills: 0 | Status: SHIPPED | OUTPATIENT
Start: 2025-01-07 | End: 2025-01-10

## 2025-01-07 SDOH — ECONOMIC STABILITY: FOOD INSECURITY: WITHIN THE PAST 12 MONTHS, THE FOOD YOU BOUGHT JUST DIDN'T LAST AND YOU DIDN'T HAVE MONEY TO GET MORE.: NEVER TRUE

## 2025-01-07 SDOH — ECONOMIC STABILITY: INCOME INSECURITY: HOW HARD IS IT FOR YOU TO PAY FOR THE VERY BASICS LIKE FOOD, HOUSING, MEDICAL CARE, AND HEATING?: NOT HARD AT ALL

## 2025-01-07 SDOH — ECONOMIC STABILITY: FOOD INSECURITY: WITHIN THE PAST 12 MONTHS, YOU WORRIED THAT YOUR FOOD WOULD RUN OUT BEFORE YOU GOT MONEY TO BUY MORE.: NEVER TRUE

## 2025-01-07 ASSESSMENT — PATIENT HEALTH QUESTIONNAIRE - PHQ9
1. LITTLE INTEREST OR PLEASURE IN DOING THINGS: NOT AT ALL
2. FEELING DOWN, DEPRESSED OR HOPELESS: NOT AT ALL
SUM OF ALL RESPONSES TO PHQ QUESTIONS 1-9: 0
SUM OF ALL RESPONSES TO PHQ QUESTIONS 1-9: 0
SUM OF ALL RESPONSES TO PHQ9 QUESTIONS 1 & 2: 0
SUM OF ALL RESPONSES TO PHQ QUESTIONS 1-9: 0
SUM OF ALL RESPONSES TO PHQ QUESTIONS 1-9: 0

## 2025-01-07 ASSESSMENT — ENCOUNTER SYMPTOMS
SORE THROAT: 0
SHORTNESS OF BREATH: 0
SINUS PRESSURE: 0
COUGH: 1
VOICE CHANGE: 0
SINUS PAIN: 0
WHEEZING: 0

## 2025-01-07 NOTE — PROGRESS NOTES
Chief Complaint   Patient presents with    Cough     Productive cough x 2 weeks - Negative COVID - OTC cold medicine, Mucinex, with minimal improvement. Denies fever/chills.      \"Have you been to the ER, urgent care clinic since your last visit?  Hospitalized since your last visit?\"    NO    “Have you seen or consulted any other health care providers outside of [facility] since your last visit?”    NO    “Have you had a colorectal cancer screening such as a colonoscopy/FIT/Cologuard?    NO    No colonoscopy on file  No cologuard on file  No FIT/FOBT on file   No flexible sigmoidoscopy on file                    1/7/2025     3:06 PM   PHQ-9    Little interest or pleasure in doing things 0   Feeling down, depressed, or hopeless 0   PHQ-2 Score 0   PHQ-9 Total Score 0           Financial Resource Strain: Low Risk  (1/7/2025)    Overall Financial Resource Strain (CARDIA)     Difficulty of Paying Living Expenses: Not hard at all      Food Insecurity: No Food Insecurity (1/7/2025)    Hunger Vital Sign     Worried About Running Out of Food in the Last Year: Never true     Ran Out of Food in the Last Year: Never true          Health Maintenance Due   Topic Date Due    HIV screen  Never done    Colorectal Cancer Screen  Never done    Shingles vaccine (1 of 2) Never done    DEXA (modify frequency per FRAX score)  Never done    Pneumococcal 65+ years Vaccine (1 of 1 - PCV) Never done    Flu vaccine (1) Never done    COVID-19 Vaccine (3 - 2023-24 season) 09/01/2024    Depression Screen  02/06/2025

## 2025-01-07 NOTE — PROGRESS NOTES
History of present illness:Elvira Gitlina is a 64 y.o. female presenting for URI symptoms.    - Symptoms started over the holidays, denis or new year.  - Most symptoms have resolved. Had experienced some subjective fever/chills, cough, throat discomfort (not particularly a sore throat), congestion, chest tenderness associated with cough. Tells me that the only symptom that continues is the cough, which has improved.  - Currently taking Robitussin DM which she's not sure is helping.  - Currently denies fever, chills, chest pain, shortness of breath, abdominal pain, lack of appetite.    Review of Systems   Constitutional:  Negative for activity change, appetite change, chills and fever.   HENT:  Negative for ear pain, sinus pressure, sinus pain, sore throat and voice change.    Respiratory:  Positive for cough. Negative for shortness of breath and wheezing.    Cardiovascular:  Negative for chest pain.   All other systems reviewed and are negative.          Past Medical History:   Diagnosis Date    DJD (degenerative joint disease), cervical 9/6/2010    Elevated cholesterol 8/09    HTN (hypertension) 6/09    Vitamin D deficiency      Past Surgical History:   Procedure Laterality Date    APPENDECTOMY  1985    REFRACTIVE SURGERY  2008     Family History   Problem Relation Age of Onset    Stroke Maternal Grandmother     Hypertension Maternal Grandmother     Stroke Mother         age 44    Hypertension Mother        Prior to Admission medications    Medication Sig Start Date End Date Taking? Authorizing Provider   benzonatate (TESSALON) 200 MG capsule Take 1 capsule by mouth 3 times daily as needed for Cough 1/7/25 1/14/25 Yes John Adnrews APRN - CNP   guaiFENesin-codeine (GUAIFENESIN AC) 100-10 MG/5ML liquid Take 10 mLs by mouth 3 times daily as needed for Cough for up to 3 days. Max Daily Amount: 30 mLs 1/7/25 1/10/25 Yes John Andrews APRN - CNP   lisinopril (PRINIVIL;ZESTRIL) 20 MG tablet Take 1 tablet by

## 2025-01-07 NOTE — PATIENT INSTRUCTIONS
The cough syrup I provided has guaifenesin, so don't overdose with the over-the-counter Robitussin.    The Tessalon Perles are okay to use with Robitussin (guaifenesin) or with Delsym (Dextromethorphan/DM).

## 2025-01-10 DIAGNOSIS — I10 ESSENTIAL (PRIMARY) HYPERTENSION: ICD-10-CM

## 2025-01-10 RX ORDER — LISINOPRIL 20 MG/1
20 TABLET ORAL DAILY
Qty: 90 TABLET | Refills: 0 | Status: SHIPPED | OUTPATIENT
Start: 2025-01-10

## 2025-01-10 NOTE — TELEPHONE ENCOUNTER
Last appointment: 01/07/2025 TONIA Andrews   Next appointment: 04/04/2025 MD Flores  Previous refill encounter(s):   02/06/2024 Lisinopril #90 with 3 refills.     For Pharmacy Admin Tracking Only    Program: Medication Refill  Intervention Detail: New Rx: 1, reason: Patient Preference  Time Spent (min): 5    Requested Prescriptions     Pending Prescriptions Disp Refills    lisinopril (PRINIVIL;ZESTRIL) 20 MG tablet 90 tablet 0     Sig: Take 1 tablet by mouth daily

## 2025-01-20 ENCOUNTER — TELEPHONE (OUTPATIENT)
Age: 65
End: 2025-01-20

## 2025-01-20 NOTE — TELEPHONE ENCOUNTER
Pt called states that cough is better but pt states that she is having slight fever and is wanting antibiotic.    [facility] [phone]

## 2025-01-23 ENCOUNTER — TELEPHONE (OUTPATIENT)
Age: 65
End: 2025-01-23

## 2025-01-23 NOTE — TELEPHONE ENCOUNTER
Outbound call to patient. Name and  verified. Patient states that she has an intermittent low grade fever and her cough is still present. Patient is requesting an antibiotic. After speaking with provider, advised patient she needs to come back into the office for re-evaluation. Patient refused stating she has a high deductible plan and she can not afford to pay another co-pay for another appointment. Patient stated that previous doctor would send in antibiotics anytime she asked without evaluating her. Informed patient that John Andrews NP would not do that without re-evaluation, and only if the antibiotics were appropriate for treatment. Once again, I attempted to schedule the patient for appointment today, stating we were willing to work with her schedule. Patient declined and disconnected call.    Mariely Long, CMA

## 2025-04-04 ENCOUNTER — OFFICE VISIT (OUTPATIENT)
Age: 65
End: 2025-04-04

## 2025-04-04 VITALS
OXYGEN SATURATION: 97 % | SYSTOLIC BLOOD PRESSURE: 134 MMHG | WEIGHT: 165.4 LBS | HEIGHT: 64 IN | BODY MASS INDEX: 28.24 KG/M2 | DIASTOLIC BLOOD PRESSURE: 75 MMHG | RESPIRATION RATE: 16 BRPM | TEMPERATURE: 97.3 F | HEART RATE: 73 BPM

## 2025-04-04 DIAGNOSIS — I10 ESSENTIAL (PRIMARY) HYPERTENSION: ICD-10-CM

## 2025-04-04 DIAGNOSIS — E55.9 VITAMIN D DEFICIENCY: ICD-10-CM

## 2025-04-04 DIAGNOSIS — Z12.11 COLON CANCER SCREENING: ICD-10-CM

## 2025-04-04 DIAGNOSIS — I10 PRIMARY HYPERTENSION: Primary | ICD-10-CM

## 2025-04-04 DIAGNOSIS — E78.5 DYSLIPIDEMIA, GOAL LDL BELOW 130: ICD-10-CM

## 2025-04-04 DIAGNOSIS — Z00.00 ANNUAL PHYSICAL EXAM: ICD-10-CM

## 2025-04-04 LAB
25(OH)D3 SERPL-MCNC: 27.7 NG/ML (ref 30–100)
ALBUMIN SERPL-MCNC: 3.9 G/DL (ref 3.5–5)
ALBUMIN/GLOB SERPL: 1.1 (ref 1.1–2.2)
ALP SERPL-CCNC: 75 U/L (ref 45–117)
ALT SERPL-CCNC: 29 U/L (ref 12–78)
ANION GAP SERPL CALC-SCNC: 0 MMOL/L (ref 2–12)
AST SERPL-CCNC: 17 U/L (ref 15–37)
BILIRUB SERPL-MCNC: 0.5 MG/DL (ref 0.2–1)
BUN SERPL-MCNC: 15 MG/DL (ref 6–20)
BUN/CREAT SERPL: 19 (ref 12–20)
CALCIUM SERPL-MCNC: 9.3 MG/DL (ref 8.5–10.1)
CHLORIDE SERPL-SCNC: 111 MMOL/L (ref 97–108)
CHOLEST SERPL-MCNC: 266 MG/DL
CO2 SERPL-SCNC: 29 MMOL/L (ref 21–32)
CREAT SERPL-MCNC: 0.8 MG/DL (ref 0.55–1.02)
ERYTHROCYTE [DISTWIDTH] IN BLOOD BY AUTOMATED COUNT: 13.2 % (ref 11.5–14.5)
GLOBULIN SER CALC-MCNC: 3.4 G/DL (ref 2–4)
GLUCOSE SERPL-MCNC: 107 MG/DL (ref 65–100)
HCT VFR BLD AUTO: 41.9 % (ref 35–47)
HDLC SERPL-MCNC: 70 MG/DL
HDLC SERPL: 3.8 (ref 0–5)
HGB BLD-MCNC: 13.6 G/DL (ref 11.5–16)
LDLC SERPL CALC-MCNC: 158.6 MG/DL (ref 0–100)
MCH RBC QN AUTO: 29.8 PG (ref 26–34)
MCHC RBC AUTO-ENTMCNC: 32.5 G/DL (ref 30–36.5)
MCV RBC AUTO: 91.7 FL (ref 80–99)
NRBC # BLD: 0 K/UL (ref 0–0.01)
NRBC BLD-RTO: 0 PER 100 WBC
PLATELET # BLD AUTO: 251 K/UL (ref 150–400)
PMV BLD AUTO: 12.3 FL (ref 8.9–12.9)
POTASSIUM SERPL-SCNC: 4.3 MMOL/L (ref 3.5–5.1)
PROT SERPL-MCNC: 7.3 G/DL (ref 6.4–8.2)
RBC # BLD AUTO: 4.57 M/UL (ref 3.8–5.2)
SODIUM SERPL-SCNC: 140 MMOL/L (ref 136–145)
TRIGL SERPL-MCNC: 187 MG/DL
VLDLC SERPL CALC-MCNC: 37.4 MG/DL
WBC # BLD AUTO: 6.2 K/UL (ref 3.6–11)

## 2025-04-04 RX ORDER — MULTIVIT-MIN/IRON/FOLIC ACID/K 18-600-40
2000 CAPSULE ORAL DAILY
COMMUNITY

## 2025-04-04 RX ORDER — LISINOPRIL 20 MG/1
20 TABLET ORAL DAILY
Qty: 90 TABLET | Refills: 3 | Status: SHIPPED | OUTPATIENT
Start: 2025-04-04

## 2025-04-04 SDOH — ECONOMIC STABILITY: FOOD INSECURITY: WITHIN THE PAST 12 MONTHS, YOU WORRIED THAT YOUR FOOD WOULD RUN OUT BEFORE YOU GOT MONEY TO BUY MORE.: NEVER TRUE

## 2025-04-04 SDOH — ECONOMIC STABILITY: FOOD INSECURITY: WITHIN THE PAST 12 MONTHS, THE FOOD YOU BOUGHT JUST DIDN'T LAST AND YOU DIDN'T HAVE MONEY TO GET MORE.: NEVER TRUE

## 2025-04-04 ASSESSMENT — PATIENT HEALTH QUESTIONNAIRE - PHQ9
2. FEELING DOWN, DEPRESSED OR HOPELESS: NOT AT ALL
SUM OF ALL RESPONSES TO PHQ QUESTIONS 1-9: 0
1. LITTLE INTEREST OR PLEASURE IN DOING THINGS: NOT AT ALL
SUM OF ALL RESPONSES TO PHQ QUESTIONS 1-9: 0

## 2025-04-04 NOTE — PROGRESS NOTES
Chief Complaint   Patient presents with    Established New Doctor     Was Dr. Maciel's patient       \"Have you been to the ER, urgent care clinic since your last visit?  Hospitalized since your last visit?\"    NO    “Have you seen or consulted any other health care providers outside of [facility] since your last visit?”    NO        “Have you had a colorectal cancer screening such as a colonoscopy/FIT/Cologuard?    NO    No colonoscopy on file  No cologuard on file  No FIT/FOBT on file   No flexible sigmoidoscopy on file         Click Here for Release of Records Request             1/7/2025     3:06 PM   PHQ-9    Little interest or pleasure in doing things 0   Feeling down, depressed, or hopeless 0   PHQ-2 Score 0   PHQ-9 Total Score 0           Financial Resource Strain: Low Risk  (1/7/2025)    Overall Financial Resource Strain (CARDIA)     Difficulty of Paying Living Expenses: Not hard at all      Food Insecurity: No Food Insecurity (1/7/2025)    Hunger Vital Sign     Worried About Running Out of Food in the Last Year: Never true     Ran Out of Food in the Last Year: Never true          Health Maintenance Due   Topic Date Due    HIV screen  Never done    Colorectal Cancer Screen  Never done    Shingles vaccine (1 of 2) Never done    Pneumococcal 50+ years Vaccine (1 of 1 - PCV) Never done    DEXA (modify frequency per FRAX score)  Never done    COVID-19 Vaccine (3 - 2024-25 season) 09/01/2024

## 2025-04-04 NOTE — PROGRESS NOTES
Elvira Gitlina (:  1960) is a 64 y.o. female, Established patient, here for evaluation of the following chief complaint(s):  Established New Doctor (Was Dr. Maciel's patient/)         Assessment & Plan  1. Health maintenance/annual physical:  Preventive health maintenance as well as care gaps reviewed with patient and addressed.  Appropriate labs and screenings ordered.  Appropriate vaccines discussed and recommended for patient. Patient also advised to include more fruits and vegetables in diet, avoiding concentrated sweets and processed foods and to exercise at least 30 minutes per day 5 times a week.    - Referral for a colonoscopy will be made.  - Send the results of the upcoming bone scan to the office.    2. Hypertension: Well controlled.  - Continue lisinopril 20 mg once daily; prescription will be sent to [facility] pharmacy for a year's supply.  - Continue monitoring blood pressure at home.    3. Hypercholesterolemia.  - Total cholesterol 291, triglycerides 224, .2 on 2024. 10-year ASCVD risk score is 8.7%.  - Continue dietary modifications and exercise.  - Consider medication if cholesterol levels increase significantly or ASCVD score worsens although patient does not wish to be on cholesterol medication at this time.    4. Vitamin D deficiency: Patient admits to not taking the supplements consistently  - Vitamin D level 18.4 last year, consistently low since .  - Continue taking over-the-counter vitamin D 1000 IU daily.  - Ensure regular intake, exposure to sunlight, and consumption of vitamin D-fortified dairy products.    5. Degenerative joint disease: Managed.  - No further intervention required.  - Continue monitoring for any recurrence of symptoms.    Follow-up  - See in a year if everything remains well controlled.    Results  Labs   - Total cholesterol: 2024, 291 mg/dL   - Triglycerides: 2024, 224 mg/dL   - LDL: 2024, 170.2 mg/dL   - Vitamin D level: ,

## 2025-04-07 ENCOUNTER — RESULTS FOLLOW-UP (OUTPATIENT)
Age: 65
End: 2025-04-07

## 2025-04-07 NOTE — RESULT ENCOUNTER NOTE
Vitamin D low, will advise patient to ensure taking her supplements regularly  Creatinine stable  LDL elevated,The 10-year ASCVD risk score (Mery JACK, et al., 2019) is: 8.5% patient does not wish to start statin so will advise her to continue working on lifestyle modifications and recheck in 6 months  CBC normal

## 2025-05-30 ENCOUNTER — TELEPHONE (OUTPATIENT)
Age: 65
End: 2025-05-30

## 2025-05-30 NOTE — TELEPHONE ENCOUNTER
----- Message from Shannan SILVERMAN sent at 5/23/2025 12:02 PM EDT -----  Regarding: ECC Message to Provider  ECC Message to Provider    Relationship to Patient: Self     Provider:   Callum Flores MD      Additional Information the patient wanted to speak with the  /practice manager regarding with bill ,an office visit bill as per the patient the billing department can't resolve her problem and did not contact her provider , and billed her with unreasonable copay , for her wellness check . The visit should be covered by her insurance.Please call back the patient . Thank you.  --------------------------------------------------------------------------------------------------------------------------    Call Back Information: OK to leave message on voicemail  Preferred Call Back Number: Phone [phone]

## 2025-06-16 ENCOUNTER — TELEPHONE (OUTPATIENT)
Age: 65
End: 2025-06-16

## 2025-06-16 NOTE — TELEPHONE ENCOUNTER
Patient was transferred from PSR to discuss bill from 4/4/25 adv she was billed an office and wellness which is why she has a balance of $77.86

## 2025-06-16 NOTE — TELEPHONE ENCOUNTER
Call and spoke to patient, two ID confirmed.   Writer informed patient that per Administration, it was an deductible. She no that is not so. Writer told her to calling Billing dept. And number given to her.